# Patient Record
Sex: MALE | Race: WHITE | URBAN - METROPOLITAN AREA
[De-identification: names, ages, dates, MRNs, and addresses within clinical notes are randomized per-mention and may not be internally consistent; named-entity substitution may affect disease eponyms.]

---

## 2017-04-13 ENCOUNTER — OFFICE VISIT (OUTPATIENT)
Dept: FAMILY MEDICINE CLINIC | Age: 37
End: 2017-04-13

## 2017-04-13 VITALS
DIASTOLIC BLOOD PRESSURE: 79 MMHG | SYSTOLIC BLOOD PRESSURE: 121 MMHG | HEART RATE: 63 BPM | WEIGHT: 254 LBS | BODY MASS INDEX: 34.4 KG/M2 | HEIGHT: 72 IN

## 2017-04-13 DIAGNOSIS — I10 ESSENTIAL HYPERTENSION, BENIGN: ICD-10-CM

## 2017-04-13 DIAGNOSIS — K21.9 GASTROESOPHAGEAL REFLUX DISEASE WITHOUT ESOPHAGITIS: ICD-10-CM

## 2017-04-13 DIAGNOSIS — J30.1 SEASONAL ALLERGIC RHINITIS DUE TO POLLEN: Primary | ICD-10-CM

## 2017-04-13 LAB
A/G RATIO: 1.7 (ref 1.1–2.2)
ALBUMIN SERPL-MCNC: 4.6 G/DL (ref 3.4–5)
ALP BLD-CCNC: 103 U/L (ref 40–129)
ALT SERPL-CCNC: 36 U/L (ref 10–40)
ANION GAP SERPL CALCULATED.3IONS-SCNC: 18 MMOL/L (ref 3–16)
AST SERPL-CCNC: 26 U/L (ref 15–37)
BILIRUB SERPL-MCNC: 0.3 MG/DL (ref 0–1)
BUN BLDV-MCNC: 11 MG/DL (ref 7–20)
CALCIUM SERPL-MCNC: 9.8 MG/DL (ref 8.3–10.6)
CHLORIDE BLD-SCNC: 101 MMOL/L (ref 99–110)
CO2: 23 MMOL/L (ref 21–32)
CREAT SERPL-MCNC: 0.9 MG/DL (ref 0.9–1.3)
GFR AFRICAN AMERICAN: >60
GFR NON-AFRICAN AMERICAN: >60
GLOBULIN: 2.7 G/DL
GLUCOSE BLD-MCNC: 85 MG/DL (ref 70–99)
POTASSIUM SERPL-SCNC: 4.5 MMOL/L (ref 3.5–5.1)
SODIUM BLD-SCNC: 142 MMOL/L (ref 136–145)
TOTAL PROTEIN: 7.3 G/DL (ref 6.4–8.2)

## 2017-04-13 PROCEDURE — 99214 OFFICE O/P EST MOD 30 MIN: CPT | Performed by: FAMILY MEDICINE

## 2017-04-13 PROCEDURE — 36415 COLL VENOUS BLD VENIPUNCTURE: CPT | Performed by: FAMILY MEDICINE

## 2017-04-13 RX ORDER — AMLODIPINE BESYLATE 5 MG/1
TABLET ORAL
Qty: 30 TABLET | Refills: 5 | Status: SHIPPED | OUTPATIENT
Start: 2017-04-13 | End: 2017-10-29 | Stop reason: SDUPTHER

## 2017-04-13 RX ORDER — LISINOPRIL 10 MG/1
TABLET ORAL
Qty: 30 TABLET | Refills: 5 | Status: SHIPPED | OUTPATIENT
Start: 2017-04-13 | End: 2017-10-29 | Stop reason: SDUPTHER

## 2017-04-13 RX ORDER — OMEPRAZOLE 20 MG/1
CAPSULE, DELAYED RELEASE ORAL
Qty: 30 CAPSULE | Refills: 5 | Status: SHIPPED | OUTPATIENT
Start: 2017-04-13 | End: 2017-10-29 | Stop reason: SDUPTHER

## 2017-04-13 RX ORDER — AZELASTINE HYDROCHLORIDE, FLUTICASONE PROPIONATE 137; 50 UG/1; UG/1
1 SPRAY, METERED NASAL 2 TIMES DAILY PRN
Qty: 1 BOTTLE | Refills: 3 | Status: SHIPPED | OUTPATIENT
Start: 2017-04-13 | End: 2022-04-25

## 2017-10-29 DIAGNOSIS — K21.9 GASTROESOPHAGEAL REFLUX DISEASE WITHOUT ESOPHAGITIS: ICD-10-CM

## 2017-10-29 DIAGNOSIS — I10 ESSENTIAL HYPERTENSION, BENIGN: ICD-10-CM

## 2017-10-30 RX ORDER — OMEPRAZOLE 20 MG/1
CAPSULE, DELAYED RELEASE ORAL
Qty: 90 CAPSULE | Refills: 3 | Status: SHIPPED | OUTPATIENT
Start: 2017-10-30 | End: 2018-11-20 | Stop reason: SDUPTHER

## 2017-10-30 RX ORDER — AMLODIPINE BESYLATE 5 MG/1
TABLET ORAL
Qty: 90 TABLET | Refills: 3 | Status: SHIPPED | OUTPATIENT
Start: 2017-10-30 | End: 2018-09-19 | Stop reason: SDUPTHER

## 2017-10-30 RX ORDER — LISINOPRIL 10 MG/1
TABLET ORAL
Qty: 90 TABLET | Refills: 3 | Status: SHIPPED | OUTPATIENT
Start: 2017-10-30 | End: 2018-09-19 | Stop reason: SDUPTHER

## 2017-11-17 ENCOUNTER — OFFICE VISIT (OUTPATIENT)
Dept: FAMILY MEDICINE CLINIC | Age: 37
End: 2017-11-17

## 2017-11-17 VITALS
BODY MASS INDEX: 34.4 KG/M2 | DIASTOLIC BLOOD PRESSURE: 89 MMHG | SYSTOLIC BLOOD PRESSURE: 137 MMHG | HEART RATE: 96 BPM | HEIGHT: 72 IN | WEIGHT: 254 LBS

## 2017-11-17 DIAGNOSIS — E66.01 MORBID OBESITY DUE TO EXCESS CALORIES (HCC): ICD-10-CM

## 2017-11-17 DIAGNOSIS — K21.9 GASTROESOPHAGEAL REFLUX DISEASE WITHOUT ESOPHAGITIS: ICD-10-CM

## 2017-11-17 DIAGNOSIS — I10 ESSENTIAL HYPERTENSION, BENIGN: Primary | ICD-10-CM

## 2017-11-17 LAB
A/G RATIO: 1.7 (ref 1.1–2.2)
ALBUMIN SERPL-MCNC: 4.7 G/DL (ref 3.4–5)
ALP BLD-CCNC: 102 U/L (ref 40–129)
ALT SERPL-CCNC: 50 U/L (ref 10–40)
ANION GAP SERPL CALCULATED.3IONS-SCNC: 15 MMOL/L (ref 3–16)
AST SERPL-CCNC: 29 U/L (ref 15–37)
BILIRUB SERPL-MCNC: 0.6 MG/DL (ref 0–1)
BUN BLDV-MCNC: 11 MG/DL (ref 7–20)
CALCIUM SERPL-MCNC: 9.5 MG/DL (ref 8.3–10.6)
CHLORIDE BLD-SCNC: 98 MMOL/L (ref 99–110)
CHOLESTEROL, TOTAL: 211 MG/DL (ref 0–199)
CO2: 25 MMOL/L (ref 21–32)
CREAT SERPL-MCNC: 0.9 MG/DL (ref 0.9–1.3)
GFR AFRICAN AMERICAN: >60
GFR NON-AFRICAN AMERICAN: >60
GLOBULIN: 2.8 G/DL
GLUCOSE BLD-MCNC: 87 MG/DL (ref 70–99)
HDLC SERPL-MCNC: 56 MG/DL (ref 40–60)
LDL CHOLESTEROL CALCULATED: 123 MG/DL
POTASSIUM SERPL-SCNC: 4.6 MMOL/L (ref 3.5–5.1)
SODIUM BLD-SCNC: 138 MMOL/L (ref 136–145)
TOTAL PROTEIN: 7.5 G/DL (ref 6.4–8.2)
TRIGL SERPL-MCNC: 161 MG/DL (ref 0–150)
VLDLC SERPL CALC-MCNC: 32 MG/DL

## 2017-11-17 PROCEDURE — 99214 OFFICE O/P EST MOD 30 MIN: CPT | Performed by: FAMILY MEDICINE

## 2017-11-17 PROCEDURE — 36415 COLL VENOUS BLD VENIPUNCTURE: CPT | Performed by: FAMILY MEDICINE

## 2017-11-17 NOTE — PROGRESS NOTES
Chief Complaint   Patient presents with    Medication Check       HPI: [place] presents for evaluation and management of mutl issues. He notes he is taking and tolerating his BP meds. Notes he is having cough productive of sputum x 2 weeks. Tried steroids with only temporary benefit. Notes his heart burn is pretty well controlled notes no dysphagia. He has not made any progress with wt loss as has not been exercising much. ROS: no fever or chills,no sob, no chest pain, no palpitation, no abd pain, no n/v/d/c, no urinary frequency or dysuria,     Allergies   Allergen Reactions    Aleve [Naproxen Sodium]      Hives    Hctz      Hives      Codeine     Ibuprofen      angioedema    Peanuts [Peanut Oil]      Rash       New Prescriptions    No medications on file     Current Outpatient Prescriptions   Medication Sig Dispense Refill    omeprazole (PRILOSEC) 20 MG delayed release capsule TAKE ONE CAPSULE BY MOUTH DAILY 90 capsule 3    amLODIPine (NORVASC) 5 MG tablet TAKE ONE TABLET BY MOUTH DAILY 90 tablet 3    lisinopril (PRINIVIL;ZESTRIL) 10 MG tablet TAKE ONE TABLET BY MOUTH DAILY 90 tablet 3    Azelastine-Fluticasone (DYMISTA) 137-50 MCG/ACT SUSP 1 spray by Nasal route 2 times daily as needed (sneezing, runny nose) 1 Bottle 3    montelukast (SINGULAIR) 10 MG tablet Take 1 tablet by mouth nightly. 30 tablet 11    Cetirizine HCl (ZYRTEC ALLERGY) 10 MG CAPS Take 10 mg by mouth daily. 30 capsule 3     No current facility-administered medications for this visit.         Past Medical History:   Diagnosis Date    GERD (gastroesophageal reflux disease) 5/12/2014    Herniated lumbar disc without myelopathy     Dr. Sia Richardson HTN (hypertension) 3/20/2012    Irritable bowel syndrome     Morbid obesity due to excess calories ([address] 9/29/2016    Urticaria 2/14/2012    Eggs, milk, peanut, wheat Dr. Sherita Shrestha         Objective   /89   Pulse 96   Ht 6' (1.829 m)   Wt 254 lb (115.2

## 2018-05-09 ENCOUNTER — TELEPHONE (OUTPATIENT)
Dept: FAMILY MEDICINE CLINIC | Age: 38
End: 2018-05-09

## 2018-09-18 ENCOUNTER — TELEPHONE (OUTPATIENT)
Dept: FAMILY MEDICINE CLINIC | Age: 38
End: 2018-09-18

## 2018-09-18 DIAGNOSIS — I10 ESSENTIAL HYPERTENSION, BENIGN: ICD-10-CM

## 2018-09-19 RX ORDER — LISINOPRIL 10 MG/1
10 TABLET ORAL DAILY
Qty: 90 TABLET | Refills: 0 | Status: SHIPPED | OUTPATIENT
Start: 2018-09-19 | End: 2018-10-23 | Stop reason: SDUPTHER

## 2018-09-19 RX ORDER — AMLODIPINE BESYLATE 5 MG/1
5 TABLET ORAL DAILY
Qty: 90 TABLET | Refills: 0 | Status: SHIPPED | OUTPATIENT
Start: 2018-09-19 | End: 2018-10-23 | Stop reason: SDUPTHER

## 2018-09-25 ENCOUNTER — TELEPHONE (OUTPATIENT)
Dept: FAMILY MEDICINE CLINIC | Age: 38
End: 2018-09-25

## 2018-10-23 DIAGNOSIS — I10 ESSENTIAL HYPERTENSION, BENIGN: ICD-10-CM

## 2018-10-24 RX ORDER — AMLODIPINE BESYLATE 5 MG/1
5 TABLET ORAL DAILY
Qty: 90 TABLET | Refills: 0 | Status: SHIPPED | OUTPATIENT
Start: 2018-10-24 | End: 2019-02-22 | Stop reason: SDUPTHER

## 2018-10-24 RX ORDER — LISINOPRIL 10 MG/1
10 TABLET ORAL DAILY
Qty: 90 TABLET | Refills: 0 | Status: SHIPPED | OUTPATIENT
Start: 2018-10-24 | End: 2018-12-03 | Stop reason: SDUPTHER

## 2018-11-20 DIAGNOSIS — K21.9 GASTROESOPHAGEAL REFLUX DISEASE WITHOUT ESOPHAGITIS: ICD-10-CM

## 2018-11-21 RX ORDER — OMEPRAZOLE 20 MG/1
CAPSULE, DELAYED RELEASE ORAL
Qty: 30 CAPSULE | Refills: 0 | Status: SHIPPED | OUTPATIENT
Start: 2018-11-21 | End: 2018-12-29 | Stop reason: SDUPTHER

## 2018-12-03 ENCOUNTER — OFFICE VISIT (OUTPATIENT)
Dept: FAMILY MEDICINE CLINIC | Age: 38
End: 2018-12-03
Payer: COMMERCIAL

## 2018-12-03 VITALS
HEART RATE: 76 BPM | HEIGHT: 72 IN | WEIGHT: 258 LBS | SYSTOLIC BLOOD PRESSURE: 124 MMHG | BODY MASS INDEX: 34.95 KG/M2 | DIASTOLIC BLOOD PRESSURE: 92 MMHG

## 2018-12-03 DIAGNOSIS — Z23 NEED FOR VACCINATION: ICD-10-CM

## 2018-12-03 DIAGNOSIS — K21.9 GASTROESOPHAGEAL REFLUX DISEASE WITHOUT ESOPHAGITIS: ICD-10-CM

## 2018-12-03 DIAGNOSIS — I10 ESSENTIAL HYPERTENSION, BENIGN: Primary | ICD-10-CM

## 2018-12-03 DIAGNOSIS — E66.01 MORBID OBESITY DUE TO EXCESS CALORIES (HCC): ICD-10-CM

## 2018-12-03 LAB
A/G RATIO: 1.6 (ref 1.1–2.2)
ALBUMIN SERPL-MCNC: 4.7 G/DL (ref 3.4–5)
ALP BLD-CCNC: 91 U/L (ref 40–129)
ALT SERPL-CCNC: 39 U/L (ref 10–40)
ANION GAP SERPL CALCULATED.3IONS-SCNC: 15 MMOL/L (ref 3–16)
AST SERPL-CCNC: 30 U/L (ref 15–37)
BILIRUB SERPL-MCNC: 0.4 MG/DL (ref 0–1)
BUN BLDV-MCNC: 14 MG/DL (ref 7–20)
CALCIUM SERPL-MCNC: 9.8 MG/DL (ref 8.3–10.6)
CHLORIDE BLD-SCNC: 105 MMOL/L (ref 99–110)
CHOLESTEROL, TOTAL: 203 MG/DL (ref 0–199)
CO2: 21 MMOL/L (ref 21–32)
CREAT SERPL-MCNC: 0.9 MG/DL (ref 0.9–1.3)
GFR AFRICAN AMERICAN: >60
GFR NON-AFRICAN AMERICAN: >60
GLOBULIN: 2.9 G/DL
GLUCOSE BLD-MCNC: 109 MG/DL (ref 70–99)
HDLC SERPL-MCNC: 51 MG/DL (ref 40–60)
LDL CHOLESTEROL CALCULATED: 139 MG/DL
POTASSIUM SERPL-SCNC: 5.3 MMOL/L (ref 3.5–5.1)
SODIUM BLD-SCNC: 141 MMOL/L (ref 136–145)
TOTAL PROTEIN: 7.6 G/DL (ref 6.4–8.2)
TRIGL SERPL-MCNC: 67 MG/DL (ref 0–150)
VLDLC SERPL CALC-MCNC: 13 MG/DL

## 2018-12-03 PROCEDURE — 90686 IIV4 VACC NO PRSV 0.5 ML IM: CPT | Performed by: FAMILY MEDICINE

## 2018-12-03 PROCEDURE — 99214 OFFICE O/P EST MOD 30 MIN: CPT | Performed by: FAMILY MEDICINE

## 2018-12-03 PROCEDURE — 36415 COLL VENOUS BLD VENIPUNCTURE: CPT | Performed by: FAMILY MEDICINE

## 2018-12-03 PROCEDURE — 90471 IMMUNIZATION ADMIN: CPT | Performed by: FAMILY MEDICINE

## 2018-12-03 RX ORDER — LISINOPRIL 10 MG/1
10 TABLET ORAL DAILY
Qty: 90 TABLET | Refills: 1 | Status: SHIPPED | OUTPATIENT
Start: 2018-12-03 | End: 2019-10-01 | Stop reason: SDUPTHER

## 2018-12-03 ASSESSMENT — ENCOUNTER SYMPTOMS
NAUSEA: 0
ABDOMINAL PAIN: 0
VOMITING: 0
DIARRHEA: 0
CONSTIPATION: 0
SHORTNESS OF BREATH: 0
COUGH: 0

## 2018-12-03 ASSESSMENT — PATIENT HEALTH QUESTIONNAIRE - PHQ9
2. FEELING DOWN, DEPRESSED OR HOPELESS: 0
SUM OF ALL RESPONSES TO PHQ QUESTIONS 1-9: 0
1. LITTLE INTEREST OR PLEASURE IN DOING THINGS: 0
SUM OF ALL RESPONSES TO PHQ QUESTIONS 1-9: 0
SUM OF ALL RESPONSES TO PHQ9 QUESTIONS 1 & 2: 0

## 2018-12-03 NOTE — PROGRESS NOTES
Chief Complaint   Patient presents with    Hypertension    Gastroesophageal Reflux    Obesity         HPI:  Kiara is a 45 y.o. (:1980) here today for multiple medical problem. He is compliant with his blood pressure medications he is out of it now though and that's what brings him today without Lightheadedness, Urinary Frequency, Edema and Sedation  He is not checking Home Blood Pressures     He is out of his medications so he thinks his blood pressure will be high today. He has a lot of stress at work. He is compliant with his reflux medications and notes no heartburn or food sticking in their throat, regurgitation,  and bitter taste, unless he doesn't take his medication and eats late or eats the foods that trigger his symptoms       Patient cites health, increased physical ability as reasons for wanting to lose weight. Thinks they have trouble with weight due to: food choices, calorie intake, eating after 6pm, and lack of physical activity  Weight loss techniques attempted: self-directed dieting and very low calorie diet  Comorbidities: GERD and hypertension          Review of Systems   Constitutional: Negative for chills and fever. Respiratory: Negative for cough and shortness of breath. Cardiovascular: Negative for chest pain and palpitations. Gastrointestinal: Negative for abdominal pain, constipation, diarrhea, nausea and vomiting. Endocrine: Negative for polyuria. Genitourinary: Negative for dysuria. Past Medical History:   Diagnosis Date    GERD (gastroesophageal reflux disease) 2014    Herniated lumbar disc without myelopathy     Dr. Maris Edwards HTN (hypertension) 3/20/2012    Irritable bowel syndrome     Morbid obesity due to excess calories [address] [address] 2016    Urticaria 2012    Eggs, milk, peanut, wheat Dr. Aurelio Echeverria     No family history on file.   Social History     Social History    Marital status: Single     Spouse name: N/A    Number of

## 2018-12-04 NOTE — PROGRESS NOTES
Call Results (labs or images) were ok, cholesterol is about the same and potassium is slightly elevated. Needs to keep follow up as suggested. Ask patient to sign up for MyChart.

## 2018-12-29 DIAGNOSIS — K21.9 GASTROESOPHAGEAL REFLUX DISEASE WITHOUT ESOPHAGITIS: ICD-10-CM

## 2019-01-03 RX ORDER — OMEPRAZOLE 20 MG/1
CAPSULE, DELAYED RELEASE ORAL
Qty: 30 CAPSULE | Refills: 0 | Status: SHIPPED | OUTPATIENT
Start: 2019-01-03 | End: 2019-02-22 | Stop reason: SDUPTHER

## 2019-01-08 ENCOUNTER — TELEPHONE (OUTPATIENT)
Dept: PRIMARY CARE CLINIC | Age: 39
End: 2019-01-08

## 2019-02-20 ENCOUNTER — TELEPHONE (OUTPATIENT)
Dept: PRIMARY CARE CLINIC | Age: 39
End: 2019-02-20

## 2019-02-21 PROBLEM — E78.00 HYPERCHOLESTEROLEMIA: Status: ACTIVE | Noted: 2019-02-21

## 2019-02-22 ENCOUNTER — OFFICE VISIT (OUTPATIENT)
Dept: PRIMARY CARE CLINIC | Age: 39
End: 2019-02-22
Payer: COMMERCIAL

## 2019-02-22 VITALS
DIASTOLIC BLOOD PRESSURE: 80 MMHG | WEIGHT: 255 LBS | HEIGHT: 72 IN | BODY MASS INDEX: 34.54 KG/M2 | SYSTOLIC BLOOD PRESSURE: 132 MMHG | HEART RATE: 77 BPM

## 2019-02-22 DIAGNOSIS — L50.9 URTICARIA: ICD-10-CM

## 2019-02-22 DIAGNOSIS — E66.01 MORBID OBESITY DUE TO EXCESS CALORIES (HCC): ICD-10-CM

## 2019-02-22 DIAGNOSIS — E78.00 HYPERCHOLESTEROLEMIA: ICD-10-CM

## 2019-02-22 DIAGNOSIS — I10 ESSENTIAL HYPERTENSION, BENIGN: Primary | ICD-10-CM

## 2019-02-22 DIAGNOSIS — K21.9 GASTROESOPHAGEAL REFLUX DISEASE WITHOUT ESOPHAGITIS: ICD-10-CM

## 2019-02-22 PROCEDURE — 99214 OFFICE O/P EST MOD 30 MIN: CPT | Performed by: FAMILY MEDICINE

## 2019-02-22 RX ORDER — OMEPRAZOLE 20 MG/1
CAPSULE, DELAYED RELEASE ORAL
Qty: 90 CAPSULE | Refills: 3 | Status: SHIPPED | OUTPATIENT
Start: 2019-02-22 | End: 2019-08-23 | Stop reason: ALTCHOICE

## 2019-02-22 RX ORDER — MONTELUKAST SODIUM 10 MG/1
10 TABLET ORAL NIGHTLY
Qty: 30 TABLET | Refills: 11 | Status: SHIPPED | OUTPATIENT
Start: 2019-02-22 | End: 2022-04-25

## 2019-02-22 RX ORDER — AMLODIPINE BESYLATE 5 MG/1
5 TABLET ORAL DAILY
Qty: 90 TABLET | Refills: 1 | Status: SHIPPED | OUTPATIENT
Start: 2019-02-22 | End: 2019-08-22 | Stop reason: SDUPTHER

## 2019-02-22 ASSESSMENT — PATIENT HEALTH QUESTIONNAIRE - PHQ9
SUM OF ALL RESPONSES TO PHQ QUESTIONS 1-9: 0
1. LITTLE INTEREST OR PLEASURE IN DOING THINGS: 0
2. FEELING DOWN, DEPRESSED OR HOPELESS: 0
SUM OF ALL RESPONSES TO PHQ9 QUESTIONS 1 & 2: 0
SUM OF ALL RESPONSES TO PHQ QUESTIONS 1-9: 0

## 2019-02-22 ASSESSMENT — ENCOUNTER SYMPTOMS
COUGH: 0
ABDOMINAL PAIN: 0
SHORTNESS OF BREATH: 0
CONSTIPATION: 0
NAUSEA: 0
DIARRHEA: 0
VOMITING: 0

## 2019-08-09 ENCOUNTER — TELEPHONE (OUTPATIENT)
Dept: PRIMARY CARE CLINIC | Age: 39
End: 2019-08-09

## 2019-08-09 NOTE — TELEPHONE ENCOUNTER
Dr. Kel Merlos 08/23:    Notes: pt not due for any PVP labs/HM. I did tell him he could fast if he wanted to since he has a 7 am appt. Has dx of HLD, but no cholesterol medications. This patient has an upcoming appointment with you for Hyperlipidemia and Hypertension. In planning for that visit I have completed the following pre-visit planning:     Pre-Visit Planning Checklist:  Patient contacted: yes  Verified patient by name and date of birth: yes    Health Maintenance items reviewed:    No pre-visit planning health maintenance topics to review at this time    Labs and procedures pended: Non-Fasting none pending/due   Labs and procedures discussed with patient: no  Reminded patient to check with their insurance company about coverage for lab tests and lab location: no    Preliminary Medication Reconciliation: was performed. Reminded patient to arrive early: yes    Please complete the med-reconciliation and sign the appropriate labs as soon as possible.       Gabby Patrick  Patient Services Specialist  [phone]

## 2019-08-23 ENCOUNTER — OFFICE VISIT (OUTPATIENT)
Dept: PRIMARY CARE CLINIC | Age: 39
End: 2019-08-23
Payer: COMMERCIAL

## 2019-08-23 VITALS
DIASTOLIC BLOOD PRESSURE: 89 MMHG | HEART RATE: 71 BPM | BODY MASS INDEX: 34.92 KG/M2 | SYSTOLIC BLOOD PRESSURE: 131 MMHG | WEIGHT: 257.8 LBS | HEIGHT: 72 IN

## 2019-08-23 DIAGNOSIS — I10 ESSENTIAL HYPERTENSION, BENIGN: Primary | ICD-10-CM

## 2019-08-23 DIAGNOSIS — E66.01 MORBID OBESITY DUE TO EXCESS CALORIES (HCC): ICD-10-CM

## 2019-08-23 DIAGNOSIS — E87.5 HYPERKALEMIA: ICD-10-CM

## 2019-08-23 DIAGNOSIS — R73.09 ELEVATED GLUCOSE: ICD-10-CM

## 2019-08-23 DIAGNOSIS — K21.9 GASTROESOPHAGEAL REFLUX DISEASE WITHOUT ESOPHAGITIS: ICD-10-CM

## 2019-08-23 LAB
A/G RATIO: 2.1 (ref 1.1–2.2)
ALBUMIN SERPL-MCNC: 4.7 G/DL (ref 3.4–5)
ALP BLD-CCNC: 81 U/L (ref 40–129)
ALT SERPL-CCNC: 36 U/L (ref 10–40)
ANION GAP SERPL CALCULATED.3IONS-SCNC: 15 MMOL/L (ref 3–16)
AST SERPL-CCNC: 23 U/L (ref 15–37)
BILIRUB SERPL-MCNC: 0.4 MG/DL (ref 0–1)
BUN BLDV-MCNC: 16 MG/DL (ref 7–20)
CALCIUM SERPL-MCNC: 9.5 MG/DL (ref 8.3–10.6)
CHLORIDE BLD-SCNC: 100 MMOL/L (ref 99–110)
CO2: 23 MMOL/L (ref 21–32)
CREAT SERPL-MCNC: 0.9 MG/DL (ref 0.9–1.3)
GFR AFRICAN AMERICAN: >60
GFR NON-AFRICAN AMERICAN: >60
GLOBULIN: 2.2 G/DL
GLUCOSE BLD-MCNC: 108 MG/DL (ref 70–99)
HBA1C MFR BLD: 5.6 %
POTASSIUM SERPL-SCNC: 4.3 MMOL/L (ref 3.5–5.1)
SODIUM BLD-SCNC: 138 MMOL/L (ref 136–145)
TOTAL PROTEIN: 6.9 G/DL (ref 6.4–8.2)

## 2019-08-23 PROCEDURE — 83036 HEMOGLOBIN GLYCOSYLATED A1C: CPT | Performed by: FAMILY MEDICINE

## 2019-08-23 PROCEDURE — 99214 OFFICE O/P EST MOD 30 MIN: CPT | Performed by: FAMILY MEDICINE

## 2019-08-23 RX ORDER — PANTOPRAZOLE SODIUM 40 MG/1
40 TABLET, DELAYED RELEASE ORAL DAILY
Qty: 30 TABLET | Refills: 11 | Status: SHIPPED | OUTPATIENT
Start: 2019-08-23 | End: 2020-03-12 | Stop reason: SDUPTHER

## 2019-08-23 ASSESSMENT — ENCOUNTER SYMPTOMS
EYE PAIN: 0
VOMITING: 0
ABDOMINAL PAIN: 0
NAUSEA: 0
COUGH: 0
CONSTIPATION: 0
RHINORRHEA: 0
COLOR CHANGE: 0
SORE THROAT: 0
DIARRHEA: 0
SHORTNESS OF BREATH: 0

## 2019-08-23 NOTE — PATIENT INSTRUCTIONS
Patient Education        DASH Diet: Care Instructions  Your Care Instructions    The DASH diet is an eating plan that can help lower your blood pressure. DASH stands for Dietary Approaches to Stop Hypertension. Hypertension is high blood pressure. The DASH diet focuses on eating foods that are high in calcium, potassium, and magnesium. These nutrients can lower blood pressure. The foods that are highest in these nutrients are fruits, vegetables, low-fat dairy products, nuts, seeds, and legumes. But taking calcium, potassium, and magnesium supplements instead of eating foods that are high in those nutrients does not have the same effect. The DASH diet also includes whole grains, fish, and poultry. The DASH diet is one of several lifestyle changes your doctor may recommend to lower your high blood pressure. Your doctor may also want you to decrease the amount of sodium in your diet. Lowering sodium while following the DASH diet can lower blood pressure even further than just the DASH diet alone. Follow-up care is a key part of your treatment and safety. Be sure to make and go to all appointments, and call your doctor if you are having problems. It's also a good idea to know your test results and keep a list of the medicines you take. How can you care for yourself at home? Following the DASH diet  · Eat 4 to 5 servings of fruit each day. A serving is 1 medium-sized piece of fruit, ½ cup chopped or canned fruit, 1/4 cup dried fruit, or 4 ounces (½ cup) of fruit juice. Choose fruit more often than fruit juice. · Eat 4 to 5 servings of vegetables each day. A serving is 1 cup of lettuce or raw leafy vegetables, ½ cup of chopped or cooked vegetables, or 4 ounces (½ cup) of vegetable juice. Choose vegetables more often than vegetable juice. · Get 2 to 3 servings of low-fat and fat-free dairy each day. A serving is 8 ounces of milk, 1 cup of yogurt, or 1 ½ ounces of cheese. · Eat 6 to 8 servings of grains each day. meals using beans and peas. Add garbanzo or kidney beans to salads. Make burritos and tacos with mashed almodovar beans or black beans. Where can you learn more? Go to https://samuel.[employer]. org and sign in to your [employer] account. Enter F399 in the regrob.com box to learn more about \"DASH Diet: Care Instructions. \"     If you do not have an account, please click on the \"Sign Up Now\" link. Current as of: July 22, 2018  Content Version: 12.1  © 3382-1153 [employer]wise, [employer]. Care instructions adapted under license by [facility] ([facility]). If you have questions about a medical condition or this instruction, always ask your healthcare professional. [address] any warranty or liability for your use of this information. You may receive a survey regarding the care you received during your visit. Your input is valuable to us. We encourage you to complete and return your survey. We hope you will choose us in the future for your healthcare needs.

## 2019-08-23 NOTE — PROGRESS NOTES
atraumatic. Right Ear: External ear normal.   Left Ear: External ear normal.   Nose: Nose normal.   Mouth/Throat: Oropharynx is clear and moist.   Eyes: Pupils are equal, round, and reactive to light. Conjunctivae and EOM are normal.   Neck: Normal range of motion. Neck supple. Cardiovascular: Normal rate, regular rhythm, normal heart sounds and intact distal pulses. Pulmonary/Chest: Effort normal and breath sounds normal.   Abdominal: Soft. Bowel sounds are normal.   Musculoskeletal: Normal range of motion. Neurological: He is oriented to person, place, and time. Skin: Skin is warm and dry. Psychiatric: He has a normal mood and affect. His behavior is normal. Judgment and thought content normal.         Chemistry        Component Value Date/Time     12/03/2018 0950    K 5.3 (H) 12/03/2018 0950     12/03/2018 0950    CO2 21 12/03/2018 0950    BUN 14 12/03/2018 0950    CREATININE 0.9 12/03/2018 0950        Component Value Date/Time    CALCIUM 9.8 12/03/2018 0950    ALKPHOS 91 12/03/2018 0950    AST 30 12/03/2018 0950    ALT 39 12/03/2018 0950    BILITOT 0.4 12/03/2018 0950          Lab Results   Component Value Date    WBC 6.5 03/28/2012    HGB 15.4 03/28/2012    HCT 44.8 03/28/2012    MCV 88.1 03/28/2012     03/28/2012     No results found for: LABA1C  No results found for: EAG  No results found for: LABA1C  No components found for: CHLPL  Lab Results   Component Value Date    TRIG 67 12/03/2018    TRIG 161 (H) 11/17/2017    TRIG 85 02/27/2015     Lab Results   Component Value Date    HDL 51 12/03/2018    HDL 56 11/17/2017    HDL 60 02/27/2015     Lab Results   Component Value Date    LDLCALC 139 (H) 12/03/2018    LDLCALC 123 (H) 11/17/2017    LDLCALC 107 (H) 02/27/2015     Lab Results   Component Value Date    LABVLDL 13 12/03/2018    LABVLDL 32 11/17/2017    LABVLDL 17 02/27/2015         Assessment   Plan     1. Essential hypertension, benign  Controlled: Appears stable.   We will

## 2019-09-30 DIAGNOSIS — I10 ESSENTIAL HYPERTENSION, BENIGN: ICD-10-CM

## 2019-10-01 RX ORDER — LISINOPRIL 10 MG/1
10 TABLET ORAL DAILY
Qty: 90 TABLET | Refills: 1 | Status: SHIPPED | OUTPATIENT
Start: 2019-10-01 | End: 2020-03-20 | Stop reason: SDUPTHER

## 2019-11-19 DIAGNOSIS — I10 ESSENTIAL HYPERTENSION, BENIGN: ICD-10-CM

## 2019-11-19 RX ORDER — AMLODIPINE BESYLATE 5 MG/1
TABLET ORAL
Qty: 90 TABLET | Refills: 0 | Status: SHIPPED | OUTPATIENT
Start: 2019-11-19 | End: 2020-02-19

## 2020-02-19 RX ORDER — OMEPRAZOLE 20 MG/1
CAPSULE, DELAYED RELEASE ORAL
Qty: 90 CAPSULE | Refills: 2 | OUTPATIENT
Start: 2020-02-19

## 2020-02-19 RX ORDER — AMLODIPINE BESYLATE 5 MG/1
TABLET ORAL
Qty: 90 TABLET | Refills: 0 | Status: SHIPPED | OUTPATIENT
Start: 2020-02-19 | End: 2020-03-20 | Stop reason: SDUPTHER

## 2020-03-12 RX ORDER — PANTOPRAZOLE SODIUM 40 MG/1
40 TABLET, DELAYED RELEASE ORAL DAILY
Qty: 30 TABLET | Refills: 11 | Status: SHIPPED | OUTPATIENT
Start: 2020-03-12 | End: 2020-03-20 | Stop reason: SDUPTHER

## 2020-03-20 ENCOUNTER — OFFICE VISIT (OUTPATIENT)
Dept: PRIMARY CARE CLINIC | Age: 40
End: 2020-03-20
Payer: COMMERCIAL

## 2020-03-20 VITALS
SYSTOLIC BLOOD PRESSURE: 138 MMHG | BODY MASS INDEX: 33.86 KG/M2 | HEART RATE: 72 BPM | TEMPERATURE: 98.9 F | WEIGHT: 250 LBS | DIASTOLIC BLOOD PRESSURE: 98 MMHG | HEIGHT: 72 IN

## 2020-03-20 PROCEDURE — 99214 OFFICE O/P EST MOD 30 MIN: CPT | Performed by: FAMILY MEDICINE

## 2020-03-20 RX ORDER — AMLODIPINE BESYLATE 5 MG/1
TABLET ORAL
Qty: 90 TABLET | Refills: 1 | Status: SHIPPED | OUTPATIENT
Start: 2020-03-20 | End: 2020-09-04 | Stop reason: SDUPTHER

## 2020-03-20 RX ORDER — LISINOPRIL 10 MG/1
10 TABLET ORAL DAILY
Qty: 90 TABLET | Refills: 1 | Status: SHIPPED | OUTPATIENT
Start: 2020-03-20 | End: 2020-09-04 | Stop reason: SDUPTHER

## 2020-03-20 RX ORDER — PANTOPRAZOLE SODIUM 40 MG/1
40 TABLET, DELAYED RELEASE ORAL DAILY
Qty: 90 TABLET | Refills: 1 | Status: SHIPPED | OUTPATIENT
Start: 2020-03-20 | End: 2020-09-04 | Stop reason: SDUPTHER

## 2020-03-20 ASSESSMENT — ENCOUNTER SYMPTOMS
DIARRHEA: 0
SHORTNESS OF BREATH: 0
SINUS PRESSURE: 0
NAUSEA: 0
SORE THROAT: 0
COUGH: 0
ABDOMINAL PAIN: 0
VOMITING: 0
CONSTIPATION: 0

## 2020-03-20 ASSESSMENT — PATIENT HEALTH QUESTIONNAIRE - PHQ9
SUM OF ALL RESPONSES TO PHQ QUESTIONS 1-9: 0
1. LITTLE INTEREST OR PLEASURE IN DOING THINGS: 0
SUM OF ALL RESPONSES TO PHQ9 QUESTIONS 1 & 2: 0
SUM OF ALL RESPONSES TO PHQ QUESTIONS 1-9: 0
2. FEELING DOWN, DEPRESSED OR HOPELESS: 0

## 2020-03-20 NOTE — PATIENT INSTRUCTIONS
If you want to feel better these are the FUNDAMENTAL PILLARS of Wellness:    Make it EASY to do the RIGHT THINGS. 1)  You can choose to Get 150 min/week of moderate exercise (can talk but can't sing) or 75 min/week of vigorous exercise (can't talk)   This will enhance your sense of well being (Exercise is as good as medicine for depression.)    2)  You can choose to Get 7-9 hours of sleep per night    Detoxifies your brain, reduces risk of dementia    3)  You can choose to Strength Train 2 x a week on non-consecutive days   This will improve function and reduce risk of injury. Body weight type exercises such as Yoga and Pilates are good    4)  You can choose good nutrition. Only eat your goal weight (in lbs) x 10 calories/day and get 5 servings of Vegetables/day   Plant based diets reduce risk of heart attack/stroke and will help you feel full on less food. Avoid highly processed foods and processed carbohydrates. 5)  You can choose moderate alcohol intake < 1-2 drinks/day   Alcohol will disrupt your sleep and add calories to your day    6)  You can choose to develop a Charismatic/Supportive relationship. This will strengthen your resilience for the ups and downs. 7)  You can choose to Practice Mindfulness. An hour a day of prayer/meditation/gratitude will change your life! Here are some recommendations for weight loss:  Check into The GI Diet or \"Primal diet\", Intermittent fasting. Take your desired weight in pounds and multiply by 10 and that is your average daily calorie allowance. For example if you wish to weigh 170 lb x 10 = 1700 josefina/day (this is how to gradually lose the weight and maintain your desired weight). Avoid soda/coke and all \"wet carbs\" => Drink ice water instead    Drink a large glass of ice water before meals and EAT SLOWLY (talk while you eat)! Rethink your hunger => it means your losing weight.     Minimize carbohydrates as they stimulate your appetite.   Avoid Bread, Rice, Pasta and Potatoes      Avoid eating calories after 6 pm  Try taking your calories only in 6 hours of the day - fast the rest of the day

## 2020-03-20 NOTE — PROGRESS NOTES
Chief Complaint   Patient presents with    Hypertension    Gastroesophageal Reflux    Obesity       HPI: Kiara presents for evaluation and management of multiple medical issues. He is here today because he was traveling out of state and left his medications in a hotel room. He has been without them for two days. He does check his home blood pressures occasionally with a wrist cuff. He is normally compliant with his medications for hypertension without dizziness, weakness or sedation. He is taking omeprazole daily and denies any changes in his GERD symptoms. He is not exercising outside of work, but states he takes nearly 12,000 steps daily. He travels for work frequently and admits that he makes poor dietary choices but has been working on it lately. He says he would like to continue to lose weight. Review of Systems   Constitutional: Negative for chills and fever. HENT: Negative for sinus pressure and sore throat. Respiratory: Negative for cough and shortness of breath. Cardiovascular: Negative for chest pain and palpitations. Gastrointestinal: Negative for abdominal pain, constipation, diarrhea, nausea and vomiting. Endocrine: Negative for polyuria. Genitourinary: Negative for dysuria.        Allergies   Allergen Reactions    Aleve [Naproxen Sodium]      Hives    Hctz      Hives      Codeine     Ibuprofen      angioedema    Peanuts [Peanut Oil]      Rash       New Prescriptions    No medications on file     Current Outpatient Medications   Medication Sig Dispense Refill    pantoprazole (PROTONIX) 40 MG tablet Take 1 tablet by mouth daily 90 tablet 1    amLODIPine (NORVASC) 5 MG tablet TAKE ONE TABLET BY MOUTH DAILY 90 tablet 1    lisinopril (PRINIVIL;ZESTRIL) 10 MG tablet Take 1 tablet by mouth daily 90 tablet 1    Azelastine-Fluticasone (DYMISTA) 137-50 MCG/ACT SUSP 1 spray by Nasal route 2 times daily as needed (sneezing, runny nose) 1 Bottle 3    Cetirizine HCl (ZYRTEC ALLERGY) 10 MG CAPS Take 10 mg by mouth daily. 30 capsule 3    montelukast (SINGULAIR) 10 MG tablet Take 1 tablet by mouth nightly (Patient not taking: Reported on 8/23/2019) 30 tablet 11     No current facility-administered medications for this visit. Past Medical History:   Diagnosis Date    GERD (gastroesophageal reflux disease) 5/12/2014    Herniated lumbar disc without myelopathy     Dr. Annabelle Bamberger HTN (hypertension) 3/20/2012    Irritable bowel syndrome     Morbid obesity due to excess calories ([address] 9/29/2016    Urticaria 2/14/2012    Eggs, milk, peanut, wheat Dr. Claudean Frock         Objective   BP (!) 138/98   Pulse 72   Temp 98.9 °F (37.2 °C)   Ht 6' (1.829 m)   Wt 250 lb (113.4 kg)   BMI 33.91 kg/m²   Wt Readings from Last 3 Encounters:   03/20/20 250 lb (113.4 kg)   08/23/19 257 lb 12.8 oz (116.9 kg)   02/22/19 255 lb (115.7 kg)       Physical Exam  Constitutional:       Appearance: Normal appearance. He is well-developed. HENT:      Head: Normocephalic. Right Ear: Tympanic membrane and ear canal normal.      Left Ear: Tympanic membrane and ear canal normal.      Nose: Nose normal.      Mouth/Throat:      Mouth: Mucous membranes are moist.      Pharynx: Uvula midline. Eyes:      General: No scleral icterus. Conjunctiva/sclera: Conjunctivae normal.      Pupils: Pupils are equal, round, and reactive to light. Neck:      Musculoskeletal: Neck supple. Thyroid: No thyromegaly. Cardiovascular:      Rate and Rhythm: Normal rate and regular rhythm. Pulses:           Dorsalis pedis pulses are 2+ on the right side and 2+ on the left side. Posterior tibial pulses are 2+ on the right side and 2+ on the left side. Heart sounds: Normal heart sounds. No murmur. No gallop. Comments: no edema lower extremities  Pulmonary:      Effort: Pulmonary effort is normal.      Breath sounds: Normal breath sounds. No wheezing or rales.    Abdominal:      General: of dietary sodium and alcohol.  - amLODIPine (NORVASC) 5 MG tablet; TAKE ONE TABLET BY MOUTH DAILY  Dispense: 90 tablet; Refill: 1  - lisinopril (PRINIVIL;ZESTRIL) 10 MG tablet; Take 1 tablet by mouth daily  Dispense: 90 tablet; Refill: 1    2. Gastroesophageal reflux disease without esophagitis  Controlled: Appears stable. We will continue current management and monitor for adverse reaction and disease progression. Follow-up as noted below    - pantoprazole (PROTONIX) 40 MG tablet; Take 1 tablet by mouth daily  Dispense: 90 tablet; Refill: 1    3. Morbid obesity due to excess calories [facility])  Improving: continue current plan of making healthier food choices  and cutting back on high calorie foods and beverages. Patient given educational materials on nutrition, exercise and fundamental pillars of wellness. Discussed use, benefit, and side effects of prescribed medications. Barriers to medication compliance addressed. All patient questions answered. Pt voiced understanding.        Health Maintenance   Topic Date Due    Varicella vaccine (1 of 2 - 2-dose childhood series) 10/03/1981    Flu vaccine (1) 09/01/2019    Potassium monitoring  08/23/2020    Creatinine monitoring  08/23/2020    DTaP/Tdap/Td vaccine (2 - Td) 04/05/2026    Shingles Vaccine (1 of 2) 10/03/2030    HIV screen  Completed    Hepatitis A vaccine  Aged Out    Hepatitis B vaccine  Aged Out    Hib vaccine  Aged Out    Meningococcal (ACWY) vaccine  Aged Out    Pneumococcal 0-64 years Vaccine  Aged Out       RTC 6 weeks

## 2020-09-04 ENCOUNTER — OFFICE VISIT (OUTPATIENT)
Dept: PRIMARY CARE CLINIC | Age: 40
End: 2020-09-04
Payer: COMMERCIAL

## 2020-09-04 VITALS
TEMPERATURE: 97.9 F | BODY MASS INDEX: 35.24 KG/M2 | SYSTOLIC BLOOD PRESSURE: 134 MMHG | DIASTOLIC BLOOD PRESSURE: 89 MMHG | WEIGHT: 259.8 LBS | HEART RATE: 65 BPM

## 2020-09-04 DIAGNOSIS — I10 ESSENTIAL HYPERTENSION, BENIGN: ICD-10-CM

## 2020-09-04 LAB
A/G RATIO: 1.8 (ref 1.1–2.2)
ALBUMIN SERPL-MCNC: 4.8 G/DL (ref 3.4–5)
ALP BLD-CCNC: 94 U/L (ref 40–129)
ALT SERPL-CCNC: 35 U/L (ref 10–40)
ANION GAP SERPL CALCULATED.3IONS-SCNC: 12 MMOL/L (ref 3–16)
AST SERPL-CCNC: 26 U/L (ref 15–37)
BILIRUB SERPL-MCNC: 0.5 MG/DL (ref 0–1)
BUN BLDV-MCNC: 12 MG/DL (ref 7–20)
CALCIUM SERPL-MCNC: 9.7 MG/DL (ref 8.3–10.6)
CHLORIDE BLD-SCNC: 101 MMOL/L (ref 99–110)
CHOLESTEROL, TOTAL: 241 MG/DL (ref 0–199)
CO2: 23 MMOL/L (ref 21–32)
CREAT SERPL-MCNC: 0.9 MG/DL (ref 0.9–1.3)
GFR AFRICAN AMERICAN: >60
GFR NON-AFRICAN AMERICAN: >60
GLOBULIN: 2.7 G/DL
GLUCOSE BLD-MCNC: 87 MG/DL (ref 70–99)
HDLC SERPL-MCNC: 52 MG/DL (ref 40–60)
LDL CHOLESTEROL CALCULATED: 168 MG/DL
POTASSIUM SERPL-SCNC: 4.4 MMOL/L (ref 3.5–5.1)
SODIUM BLD-SCNC: 136 MMOL/L (ref 136–145)
TOTAL PROTEIN: 7.5 G/DL (ref 6.4–8.2)
TRIGL SERPL-MCNC: 103 MG/DL (ref 0–150)
VLDLC SERPL CALC-MCNC: 21 MG/DL

## 2020-09-04 PROCEDURE — 99214 OFFICE O/P EST MOD 30 MIN: CPT | Performed by: FAMILY MEDICINE

## 2020-09-04 RX ORDER — AMLODIPINE BESYLATE 5 MG/1
TABLET ORAL
Qty: 90 TABLET | Refills: 1 | Status: SHIPPED | OUTPATIENT
Start: 2020-09-04 | End: 2021-07-08

## 2020-09-04 RX ORDER — LISINOPRIL 10 MG/1
10 TABLET ORAL DAILY
Qty: 90 TABLET | Refills: 1 | Status: SHIPPED | OUTPATIENT
Start: 2020-09-04 | End: 2020-09-14

## 2020-09-04 RX ORDER — PANTOPRAZOLE SODIUM 40 MG/1
40 TABLET, DELAYED RELEASE ORAL DAILY
Qty: 90 TABLET | Refills: 1 | Status: SHIPPED | OUTPATIENT
Start: 2020-09-04 | End: 2021-10-25 | Stop reason: SDUPTHER

## 2020-09-04 ASSESSMENT — ENCOUNTER SYMPTOMS
VOMITING: 0
ABDOMINAL PAIN: 0
SHORTNESS OF BREATH: 0
NAUSEA: 0
CONSTIPATION: 0
DIARRHEA: 0
COUGH: 0

## 2020-09-04 NOTE — PATIENT INSTRUCTIONS
how to gradually lose the weight and maintain your desired weight). Avoid soda/coke and all \"wet carbs\" => Drink ice water instead    Drink a large glass of ice water before meals and EAT SLOWLY (talk while you eat)! Rethink your hunger => it means your losing weight. Minimize highly processed carbohydrates as they stimulate your appetite:  Specifically cut back on Bread, Rice, Pasta and Potatoes    Avoid eating calories after 6 pm      Just How Much of a Benefit Do We Get from a Healthful Lifestyle? Xiomara Jacobo MD reviewing Pavan Ni et al. BMJ 2020 Jan 8  A long-term analysis suggests that adopting such a lifestyle at midlife might add as long as 10 years of disease-free life. Virtually everyone knows that a healthful lifestyle -- never smoking, normal body-mass index (BMI), moderate-to-vigorous physical activity, moderate alcohol intake, and a higher-quality diet -- is good for their health. What very few people know is just how much benefit they get from achieving all these lifestyle goals. A [place] team examined data from about 111,000 people at age 48 and followed them prospectively for as long as 34 years. Healthful lifestyle factors were measured repeatedly and systematically, and development of various diseases and death were recorded. The primary endpoint was life expectancy free from diabetes, cardiovascular diseases, and cancer. Women who met all the healthful lifestyle measures had an additional 10.7 years of disease-free life compared with women who met no healthful lifestyle measures. For men, the number was 7.6 additional disease-free years.

## 2020-09-04 NOTE — PROGRESS NOTES
Chief Complaint   Patient presents with    Hypertension    Gastroesophageal Reflux    Obesity         HPI:  Kiara is a 44 y.o. (:1980) here today   for multiple medical issues    Kiara feels pretty good. He is taking and tolerating his blood pressure medicine without lightheadedness or dizziness. He is also taking his PPI therapy for his reflux. Notes that is well controlled without food sticking in his throat or heartburn. He continues to struggle with diet. He has an active job but has not been managing his food intake very well lately. Review of Systems   Constitutional: Negative for chills and fever. Respiratory: Negative for cough and shortness of breath. Cardiovascular: Negative for chest pain and palpitations. Gastrointestinal: Negative for abdominal pain, constipation, diarrhea, nausea and vomiting. Endocrine: Negative for polyuria. Genitourinary: Negative for dysuria. Past Medical History:   Diagnosis Date    GERD (gastroesophageal reflux disease) 2014    Herniated lumbar disc without myelopathy     Dr. Alexandro Sandoval HTN (hypertension) 3/20/2012    Irritable bowel syndrome     Morbid obesity due to excess calories ([facility] [address] 2016    Urticaria 2012    Eggs, milk, peanut, wheat Dr. Juan Carlos Portillo     History reviewed. No pertinent family history.   Social History     Socioeconomic History    Marital status: Single     Spouse name: Cass Gutiérrez Number of children: Not on file    Years of education: Not on file    Highest education level: Not on file   Occupational History    Occupation: [employer] Financial resource strain: Not on file    Food insecurity     Worry: Not on file     Inability: Not on file   [employer] needs     Medical: Not on file     Non-medical: Not on file   Tobacco Use    Smoking status: Never Smoker    Smokeless tobacco: Never Used   Substance and Sexual Activity    Alcohol use: Yes     Comment: weekends    Drug use: Not on file    Sexual activity: Not on file   Lifestyle    Physical activity     Days per week: Not on file     Minutes per session: Not on file    Stress: Not on file   Relationships    Social connections     Talks on phone: Not on file     Gets together: Not on file     Attends Ten[employer] service: Not on file     Active member of club or organization: Not on file     Attends meetings of clubs or organizations: Not on file     Relationship status: Not on file    Intimate partner violence     Fear of current or ex partner: Not on file     Emotionally abused: Not on file     Physically abused: Not on file     Forced sexual activity: Not on file   Other Topics Concern    Not on file   Social History Narrative    Not on file       New Prescriptions    No medications on file         Meds Prior to visit:  Prior to Visit Medications    Medication Sig Taking? Authorizing Provider   pantoprazole (PROTONIX) 40 MG tablet Take 1 tablet by mouth daily Yes Tata Holder MD   amLODIPine (NORVASC) 5 MG tablet TAKE ONE TABLET BY MOUTH DAILY Yes Tata Holder MD   lisinopril (PRINIVIL;ZESTRIL) 10 MG tablet Take 1 tablet by mouth daily Yes Tata Holder MD   Azelastine-Fluticasone [facility]) 137-50 MCG/ACT SUSP 1 spray by Nasal route 2 times daily as needed (sneezing, runny nose) Yes Tata Holder MD   montelukast (SINGULAIR) 10 MG tablet Take 1 tablet by mouth nightly  Patient not taking: Reported on 8/23/2019  Tata Holder MD   Cetirizine HCl (ZYRTEC ALLERGY) 10 MG CAPS Take 10 mg by mouth daily.   Patient not taking: Reported on 9/4/2020  Tata Holder MD     Allergies   Allergen Reactions    Aleve [Naproxen Sodium]      Hives    Hctz      Hives      Codeine     Ibuprofen      angioedema    Peanuts [Peanut Oil]      Rash         OBJECTIVE:    /89   Pulse 65   Temp 97.9 °F (36.6 °C) (Oral)   Wt 259 lb 12.8 oz (117.8 kg)   BMI 35.24 kg/m²   BP Readings from Last 2 Encounters:   09/04/20 134/89   03/20/20 (!) 138/98     Wt Readings from Last 3 Encounters:   09/04/20 259 lb 12.8 oz (117.8 kg)   03/20/20 250 lb (113.4 kg)   08/23/19 257 lb 12.8 oz (116.9 kg)       Physical Exam        Hemoglobin A1C (%)   Date Value   08/23/2019 5.6     LDL Calculated (mg/dL)   Date Value   12/03/2018 139 (H)       ASSESSMENT/PLAN:    1. Essential hypertension, benign  Controlled: Appears stable. We will continue current management and monitor for adverse reaction and disease progression. Follow-up as noted below    - Comprehensive Metabolic Panel; Future  - Lipid Panel; Future  - amLODIPine (NORVASC) 5 MG tablet; TAKE ONE TABLET BY MOUTH DAILY  Dispense: 90 tablet; Refill: 1  - lisinopril (PRINIVIL;ZESTRIL) 10 MG tablet; Take 1 tablet by mouth daily  Dispense: 90 tablet; Refill: 1    2. Gastroesophageal reflux disease without esophagitis  Controlled: Appears stable. We will continue current management and monitor for adverse reaction and disease progression. Follow-up as noted below    - pantoprazole (PROTONIX) 40 MG tablet; Take 1 tablet by mouth daily  Dispense: 90 tablet; Refill: 1    3. Morbid obesity due to excess calories ([address]  Counseled on diet and exercise. Motivational interviewing and removal of barriers for good habits. RTC No follow-ups on file. Scribe attestation:  Deniz Sandoval LPN, am scribing for and in the presence of Vick Cortes MD. Electronically signed by Margaret Daugherty LPN on 6/4/3218 at 58:64 AM            Provider attestation: Deniz Sandoval LPN, personally performed the services scribed by the user listed above in my presence, and it is both accurate and complete. I agree with the ROS and Past Histories independently gathered by the clinical support staff and the remaining scribed note accurately describes my personal service to the patient.     [facility]    9/4/2020  11:26 AM

## 2020-09-05 ENCOUNTER — TELEPHONE (OUTPATIENT)
Dept: PRIMARY CARE CLINIC | Age: 40
End: 2020-09-05

## 2020-09-05 NOTE — TELEPHONE ENCOUNTER
Please call:  His cholesterol is worse. If he would sign up for [employer] I could send him information on a diet he could employ to address that.

## 2020-09-14 RX ORDER — LISINOPRIL 10 MG/1
TABLET ORAL
Qty: 90 TABLET | Refills: 3 | Status: SHIPPED | OUTPATIENT
Start: 2020-09-14 | End: 2021-07-08 | Stop reason: SDUPTHER

## 2020-10-26 ENCOUNTER — TELEPHONE (OUTPATIENT)
Dept: PRIMARY CARE CLINIC | Age: 40
End: 2020-10-26

## 2020-10-26 DIAGNOSIS — J06.9 VIRAL URI: Primary | ICD-10-CM

## 2020-10-27 ENCOUNTER — OFFICE VISIT (OUTPATIENT)
Dept: PRIMARY CARE CLINIC | Age: 40
End: 2020-10-27
Payer: COMMERCIAL

## 2020-10-27 PROCEDURE — 99211 OFF/OP EST MAY X REQ PHY/QHP: CPT | Performed by: NURSE PRACTITIONER

## 2020-10-27 NOTE — PROGRESS NOTES
[address] [place] [address] received a viral test for COVID-19. They were educated on isolation and quarantine as appropriate. For any symptoms, they were directed to seek care from their PCP, given contact information to establish with a doctor, directed to an urgent care or the emergency room.

## 2020-10-28 LAB — SARS-COV-2, NAA: NOT DETECTED

## 2020-10-29 NOTE — RESULT ENCOUNTER NOTE

## 2021-07-08 ENCOUNTER — TELEPHONE (OUTPATIENT)
Dept: PRIMARY CARE CLINIC | Age: 41
End: 2021-07-08

## 2021-07-08 DIAGNOSIS — I10 ESSENTIAL HYPERTENSION, BENIGN: ICD-10-CM

## 2021-07-08 RX ORDER — AMLODIPINE BESYLATE 5 MG/1
TABLET ORAL
Qty: 90 TABLET | Refills: 0 | Status: SHIPPED | OUTPATIENT
Start: 2021-07-08 | End: 2021-07-08 | Stop reason: SDUPTHER

## 2021-07-08 RX ORDER — LISINOPRIL 10 MG/1
TABLET ORAL
Qty: 90 TABLET | Refills: 3 | Status: SHIPPED | OUTPATIENT
Start: 2021-07-08 | End: 2022-07-11

## 2021-07-08 RX ORDER — AMLODIPINE BESYLATE 5 MG/1
TABLET ORAL
Qty: 90 TABLET | Refills: 0 | Status: SHIPPED | OUTPATIENT
Start: 2021-07-08 | End: 2022-01-04

## 2021-07-08 NOTE — TELEPHONE ENCOUNTER
----- Message from Mallory Aguilar sent at 7/8/2021  8:50 AM EDT -----  Subject: Refill Request    QUESTIONS  Name of Medication? lisinopril (PRINIVIL;ZESTRIL) 10 MG tablet  Patient-reported dosage and instructions? 1 pill once a day  How many days do you have left? 6  [facility]  Pharmacy phone number (if available)? [phone]  Additional Information for Provider? 90 day supply  ---------------------------------------------------------------------------  --------------,  Name of Medication? amLODIPine (NORVASC) 5 MG tablet  Patient-reported dosage and instructions? 1 pill once a day  How many days do you have left? 6  Preferred Pharmacy? [place] [address]  Pharmacy phone number (if available)? [phone]  Additional Information for Provider? 90 day supply patient is leaving on   vacation tomorrow needs asap please  ---------------------------------------------------------------------------  --------------  [address]  What is the best way for the office to contact you? OK to leave message on   voicemail  Preferred Call Back Phone Number?  [phone]

## 2021-07-08 NOTE — TELEPHONE ENCOUNTER
Medication:   Requested Prescriptions     Pending Prescriptions Disp Refills    amLODIPine (NORVASC) 5 MG tablet [Pharmacy Med Name: amLODIPine BESYLATE 5 MG TAB] 90 tablet 0     Sig: TAKE ONE TABLET BY MOUTH DAILY        Last Filled:  09/04/20    Patient Phone Number: [phone] (home) [phone] (work)    Last appt: 9/4/2020   Next appt: 7/20/2021    Last OARRS: No flowsheet data found.

## 2021-07-20 ENCOUNTER — OFFICE VISIT (OUTPATIENT)
Dept: PRIMARY CARE CLINIC | Age: 41
End: 2021-07-20
Payer: COMMERCIAL

## 2021-07-20 VITALS
SYSTOLIC BLOOD PRESSURE: 138 MMHG | BODY MASS INDEX: 35.95 KG/M2 | WEIGHT: 265.4 LBS | HEIGHT: 72 IN | HEART RATE: 76 BPM | DIASTOLIC BLOOD PRESSURE: 86 MMHG

## 2021-07-20 DIAGNOSIS — I10 ESSENTIAL HYPERTENSION, BENIGN: Primary | ICD-10-CM

## 2021-07-20 DIAGNOSIS — E78.00 HYPERCHOLESTEROLEMIA: ICD-10-CM

## 2021-07-20 DIAGNOSIS — K21.9 GASTROESOPHAGEAL REFLUX DISEASE WITHOUT ESOPHAGITIS: ICD-10-CM

## 2021-07-20 DIAGNOSIS — E66.01 MORBID OBESITY DUE TO EXCESS CALORIES (HCC): ICD-10-CM

## 2021-07-20 PROCEDURE — 99214 OFFICE O/P EST MOD 30 MIN: CPT | Performed by: FAMILY MEDICINE

## 2021-07-20 ASSESSMENT — PATIENT HEALTH QUESTIONNAIRE - PHQ9
SUM OF ALL RESPONSES TO PHQ QUESTIONS 1-9: 0
SUM OF ALL RESPONSES TO PHQ9 QUESTIONS 1 & 2: 0
1. LITTLE INTEREST OR PLEASURE IN DOING THINGS: 0
SUM OF ALL RESPONSES TO PHQ QUESTIONS 1-9: 0
SUM OF ALL RESPONSES TO PHQ QUESTIONS 1-9: 0
2. FEELING DOWN, DEPRESSED OR HOPELESS: 0

## 2021-07-20 NOTE — PROGRESS NOTES
Chief Complaint   Patient presents with    Hypertension    Hyperlipidemia    Gastroesophageal Reflux       HPI: Kiara presents for evaluation and management of pretension, cholesterol, reflux and obesity. Kiara notes that he is taking and tolerating his blood pressure medicine. He denies any lightheadedness or dizziness except if he has been working very hard sweating a lot and stands up quickly from a bent over position. He has been working on his diet to manage his cholesterol. Tells me that he and his wife are eating better and making better food choices and he has lost about 8 pounds doing so over the last 2 months. He notes his reflux is better on the Protonix. If he misses a dose though he will have trouble with heartburn. He still has trouble if he eats tomatoes.     Today's PHQ:    PHQ Scores 7/20/2021 3/20/2020 2/22/2019 12/3/2018   PHQ2 Score 0 0 0 0   PHQ9 Score 0 0 0 0     Interpretation of Total Score Depression Severity: 1-4 = Minimal depression, 5-9 = Mild depression, 10-14 = Moderate depression, 15-19 = Moderately severe depression, 20-27 = Severe depression        Review of Systems    Allergies   Allergen Reactions    Aleve [Naproxen Sodium]      Hives    Hctz      Hives      Codeine     Ibuprofen      angioedema    Peanuts [Peanut Oil]      Rash       New Prescriptions    No medications on file     Current Outpatient Medications   Medication Sig Dispense Refill    lisinopril (PRINIVIL;ZESTRIL) 10 MG tablet TAKE ONE TABLET BY MOUTH DAILY 90 tablet 3    amLODIPine (NORVASC) 5 MG tablet TAKE ONE TABLET BY MOUTH DAILY 90 tablet 0    pantoprazole (PROTONIX) 40 MG tablet Take 1 tablet by mouth daily 90 tablet 1    Azelastine-Fluticasone (DYMISTA) 137-50 MCG/ACT SUSP 1 spray by Nasal route 2 times daily as needed (sneezing, runny nose) 1 Bottle 3    montelukast (SINGULAIR) 10 MG tablet Take 1 tablet by mouth nightly (Patient not taking: Reported on 8/23/2019) 30 tablet 11    Cetirizine HCl (ZYRTEC ALLERGY) 10 MG CAPS Take 10 mg by mouth daily. (Patient not taking: Reported on 9/4/2020) 30 capsule 3     No current facility-administered medications for this visit. Past Medical History:   Diagnosis Date    GERD (gastroesophageal reflux disease) 5/12/2014    Herniated lumbar disc without myelopathy     Dr. Ace Bashir HTN (hypertension) 3/20/2012    Irritable bowel syndrome     Morbid obesity due to excess calories ([address] 9/29/2016    Urticaria 2/14/2012    Eggs, milk, peanut, wheat Dr. Uma Whalen         Objective   /86 (Site: Left Upper Arm, Position: Sitting, Cuff Size: Large Adult)   Pulse 76   Ht 6' (1.829 m)   Wt 265 lb 6.4 oz (120.4 kg)   BMI 35.99 kg/m²   Wt Readings from Last 3 Encounters:   07/20/21 265 lb 6.4 oz (120.4 kg)   09/04/20 259 lb 12.8 oz (117.8 kg)   03/20/20 250 lb (113.4 kg)       Physical Exam  Constitutional:       Appearance: He is well-developed. He is obese. Cardiovascular:      Rate and Rhythm: Normal rate and regular rhythm. Heart sounds: No murmur heard. No friction rub. No gallop. Pulmonary:      Effort: Pulmonary effort is normal.      Breath sounds: Normal breath sounds. No wheezing or rales. Abdominal:      General: Bowel sounds are normal. There is no distension. Palpations: Abdomen is soft. There is no mass. Tenderness: There is no abdominal tenderness. Skin:     General: Skin is warm and dry. Findings: No rash.            Chemistry        Component Value Date/Time     09/04/2020 1318    K 4.4 09/04/2020 1318     09/04/2020 1318    CO2 23 09/04/2020 1318    BUN 12 09/04/2020 1318    CREATININE 0.9 09/04/2020 1318        Component Value Date/Time    CALCIUM 9.7 09/04/2020 1318    ALKPHOS 94 09/04/2020 1318    AST 26 09/04/2020 1318    ALT 35 09/04/2020 1318    BILITOT 0.5 09/04/2020 1318          Lab Results   Component Value Date    WBC 6.5 03/28/2012    HGB 15.4 03/28/2012    HCT 44.8 03/28/2012    MCV 88.1 03/28/2012     03/28/2012     Lab Results   Component Value Date    LABA1C 5.6 08/23/2019     No results found for: EAG  Lab Results   Component Value Date    LABA1C 5.6 08/23/2019     No components found for: CHLPL  Lab Results   Component Value Date    TRIG 103 09/04/2020    TRIG 67 12/03/2018    TRIG 161 (H) 11/17/2017     Lab Results   Component Value Date    HDL 52 09/04/2020    HDL 51 12/03/2018    HDL 56 11/17/2017     Lab Results   Component Value Date    LDLCALC 168 (H) 09/04/2020    LDLCALC 139 (H) 12/03/2018    LDLCALC 123 (H) 11/17/2017     Lab Results   Component Value Date    LABVLDL 21 09/04/2020    LABVLDL 13 12/03/2018    LABVLDL 32 11/17/2017         Assessment   Plan   1. Essential hypertension, benign    Controlled: Appears stable. We will continue current management and monitor for adverse reaction and disease progression. Follow-up as noted below    2. Hypercholesterolemia   Continue to work on diet and exercise. We will recheck his labs in September  3. Gastroesophageal reflux disease without esophagitis   Controlled: Appears stable. We will continue current management and monitor for adverse reaction and disease progression. Follow-up as noted below    4. Morbid obesity due to excess calories ([address]    Counseled on diet and exercise. Motivational interviewing and removal of barriers for good habits. Kiara received counseling on the following healthy behaviors: nutrition and exercise    Patient given educational materials on Nutrition and Exercise  Discussed use, benefit, and side effects of prescribed medications. Barriers to medication compliance addressed. All patient questions answered. Pt voiced understanding. RTC Return in about 3 months (around 10/20/2021).

## 2021-07-20 NOTE — PATIENT INSTRUCTIONS
To work on better and longer life  1) determine your health goals for the year  2) determine what changes you need to achieve those goals  3) design your daily routine, shopping habits etc to implement those changes  => Default Right Action (no choices)  4) I invite you to send me your plans via [employer] so I can continue to help you with them    Examine your lifestyle and the barriers to bad and good habits and how you can design your life to make better choices    If you want to feel better these are the FUNDAMENTAL PILLARS of Wellness:    Make it EASY to do the [address]. 1)  You can choose to Get 150 min/week of moderate exercise (can talk but can't sing) or 75 min/week of vigorous exercise (can't talk)   This will enhance your sense of well being (Exercise is as good as medicine for depression.)    2)  You can choose to Get 7-9 hours of sleep per night    Detoxifies your brain, reduces risk of dementia    3)  You can choose to Strength Train 2 x a week on non-consecutive days   This will improve function and reduce risk of injury. Body weight type exercises such as Yoga and Pilates are good    4)  You can choose good nutrition. Only eat your goal weight (in lbs) x 10 calories/day and get 5 servings of Vegetables/day   Plant based diets reduce risk of heart attack/stroke and will help you feel full on less food. Avoid highly processed foods and processed carbohydrates. 5)  You can choose moderate alcohol intake < 1-2 drinks/day   Alcohol will disrupt your sleep and add calories to your day    6)  You can choose to develop a Charismatic/Supportive relationship. This will strengthen your resilience for the ups and downs. 7)  You can choose to Practice Mindfulness. An hour a day of prayer/meditation/gratitude will change your life! If you are trying to lose weight, here are some recommendations for weight loss:  Not every weight loss program is appropriate for everybody. ..  good online

## 2021-10-25 ENCOUNTER — OFFICE VISIT (OUTPATIENT)
Dept: PRIMARY CARE CLINIC | Age: 41
End: 2021-10-25
Payer: COMMERCIAL

## 2021-10-25 VITALS
BODY MASS INDEX: 35.62 KG/M2 | DIASTOLIC BLOOD PRESSURE: 76 MMHG | WEIGHT: 263 LBS | SYSTOLIC BLOOD PRESSURE: 116 MMHG | HEIGHT: 72 IN | HEART RATE: 64 BPM

## 2021-10-25 DIAGNOSIS — E66.01 MORBID OBESITY DUE TO EXCESS CALORIES (HCC): ICD-10-CM

## 2021-10-25 DIAGNOSIS — K21.9 GASTROESOPHAGEAL REFLUX DISEASE WITHOUT ESOPHAGITIS: ICD-10-CM

## 2021-10-25 DIAGNOSIS — Z23 NEED FOR VACCINATION: ICD-10-CM

## 2021-10-25 DIAGNOSIS — I10 ESSENTIAL HYPERTENSION, BENIGN: Primary | ICD-10-CM

## 2021-10-25 DIAGNOSIS — E78.00 HYPERCHOLESTEROLEMIA: ICD-10-CM

## 2021-10-25 PROCEDURE — 90674 CCIIV4 VAC NO PRSV 0.5 ML IM: CPT | Performed by: FAMILY MEDICINE

## 2021-10-25 PROCEDURE — 99214 OFFICE O/P EST MOD 30 MIN: CPT | Performed by: FAMILY MEDICINE

## 2021-10-25 PROCEDURE — 90471 IMMUNIZATION ADMIN: CPT | Performed by: FAMILY MEDICINE

## 2021-10-25 RX ORDER — PANTOPRAZOLE SODIUM 40 MG/1
40 TABLET, DELAYED RELEASE ORAL DAILY
Qty: 90 TABLET | Refills: 1 | Status: SHIPPED | OUTPATIENT
Start: 2021-10-25 | End: 2022-04-25 | Stop reason: SDUPTHER

## 2021-10-25 NOTE — PATIENT INSTRUCTIONS
You will want to call the lab before you go in to see if you can get your labs at any particular site. You should be able to get your labs at one of the following locations:    My office building at [facility] [phone]  Open 7:30-3:30. Or one of these locations if more convenient:  [employer]     [address] [place] [address], [address] [place] [address]    M-F 7a-6p;   8a-12p  [phone]    [address]      M-F 9a-7:65p  [address] [facility]    [address] [facility] 24    -F 7a-5p [phone]    [facility]  [address]  M-F 7a-5p[address]  [phone]

## 2021-10-25 NOTE — PROGRESS NOTES
Chief Complaint   Patient presents with    Hypertension    Gastroesophageal Reflux    Obesity       HPI: Kiara presents for evaluation and management of hypertension, reflux and obesity. Kiara returns to clinic noting that he is taking and tolerating his blood pressure medicine. Denies lightheadedness or dizziness. He reports he is taking tolerating his Protonix. He uses it more on an as-needed basis as his symptoms are pretty controlled when he follows his diet. He states he is running low and would need a refill. He denies food sticking in his throat or unintentional weight loss. He has been working on trying to follow a better diet. Has cut out sweet drinks and is drinking water instead of that. Review of Systems    Allergies   Allergen Reactions    Aleve [Naproxen Sodium]      Hives    Hctz      Hives      Codeine     Ibuprofen      angioedema    Peanuts [Peanut Oil]      Rash       New Prescriptions    No medications on file     Current Outpatient Medications   Medication Sig Dispense Refill    pantoprazole (PROTONIX) 40 MG tablet Take 1 tablet by mouth daily 90 tablet 1    lisinopril (PRINIVIL;ZESTRIL) 10 MG tablet TAKE ONE TABLET BY MOUTH DAILY 90 tablet 3    amLODIPine (NORVASC) 5 MG tablet TAKE ONE TABLET BY MOUTH DAILY 90 tablet 0    Azelastine-Fluticasone (DYMISTA) 137-50 MCG/ACT SUSP 1 spray by Nasal route 2 times daily as needed (sneezing, runny nose) 1 Bottle 3    montelukast (SINGULAIR) 10 MG tablet Take 1 tablet by mouth nightly (Patient not taking: Reported on 8/23/2019) 30 tablet 11    Cetirizine HCl (ZYRTEC ALLERGY) 10 MG CAPS Take 10 mg by mouth daily. (Patient not taking: Reported on 9/4/2020) 30 capsule 3     No current facility-administered medications for this visit.        Past Medical History:   Diagnosis Date    GERD (gastroesophageal reflux disease) 5/12/2014    Herniated lumbar disc without myelopathy     Dr. Yomaira Hayward    HTN (hypertension) 3/20/2012    Irritable bowel syndrome     Morbid obesity due to excess calories ([address] 9/29/2016    Urticaria 2/14/2012    Eggs, milk, peanut, wheat Dr. Earnest Flor         Objective   /76   Pulse 64   Ht 6' (1.829 m)   Wt 263 lb (119.3 kg)   BMI 35.67 kg/m²   Wt Readings from Last 3 Encounters:   10/25/21 263 lb (119.3 kg)   07/20/21 265 lb 6.4 oz (120.4 kg)   09/04/20 259 lb 12.8 oz (117.8 kg)       Physical Exam  Constitutional:       Appearance: He is well-developed. Cardiovascular:      Rate and Rhythm: Normal rate and regular rhythm. Pulses:           Dorsalis pedis pulses are 2+ on the right side and 2+ on the left side. Posterior tibial pulses are 2+ on the right side and 2+ on the left side. Heart sounds: No murmur heard. No friction rub. No gallop. Comments: No Lower Extremity Edema  Pulmonary:      Effort: Pulmonary effort is normal.      Breath sounds: Normal breath sounds. No wheezing or rales. Abdominal:      General: Bowel sounds are normal. There is no distension. Palpations: Abdomen is soft. There is no mass. Tenderness: There is no abdominal tenderness. Skin:     General: Skin is warm and dry. Findings: No rash.            Chemistry        Component Value Date/Time     09/04/2020 1318    K 4.4 09/04/2020 1318     09/04/2020 1318    CO2 23 09/04/2020 1318    BUN 12 09/04/2020 1318    CREATININE 0.9 09/04/2020 1318        Component Value Date/Time    CALCIUM 9.7 09/04/2020 1318    ALKPHOS 94 09/04/2020 1318    AST 26 09/04/2020 1318    ALT 35 09/04/2020 1318    BILITOT 0.5 09/04/2020 1318          Lab Results   Component Value Date    WBC 6.5 03/28/2012    HGB 15.4 03/28/2012    HCT 44.8 03/28/2012    MCV 88.1 03/28/2012     03/28/2012     Lab Results   Component Value Date    LABA1C 5.6 08/23/2019     No results found for: EAG  Lab Results   Component Value Date    LABA1C 5.6 08/23/2019     No components found for: CHLPL  Lab

## 2021-10-27 DIAGNOSIS — E78.00 HYPERCHOLESTEROLEMIA: ICD-10-CM

## 2021-10-27 DIAGNOSIS — I10 ESSENTIAL HYPERTENSION, BENIGN: ICD-10-CM

## 2021-10-27 LAB
A/G RATIO: 1.7 (ref 1.1–2.2)
ALBUMIN SERPL-MCNC: 5 G/DL (ref 3.4–5)
ALP BLD-CCNC: 92 U/L (ref 40–129)
ALT SERPL-CCNC: 38 U/L (ref 10–40)
ANION GAP SERPL CALCULATED.3IONS-SCNC: 16 MMOL/L (ref 3–16)
AST SERPL-CCNC: 30 U/L (ref 15–37)
BILIRUB SERPL-MCNC: 0.4 MG/DL (ref 0–1)
BUN BLDV-MCNC: 12 MG/DL (ref 7–20)
CALCIUM SERPL-MCNC: 9.6 MG/DL (ref 8.3–10.6)
CHLORIDE BLD-SCNC: 100 MMOL/L (ref 99–110)
CHOLESTEROL, TOTAL: 221 MG/DL (ref 0–199)
CO2: 22 MMOL/L (ref 21–32)
CREAT SERPL-MCNC: 1 MG/DL (ref 0.9–1.3)
GFR AFRICAN AMERICAN: >60
GFR NON-AFRICAN AMERICAN: >60
GLOBULIN: 3 G/DL
GLUCOSE BLD-MCNC: 82 MG/DL (ref 70–99)
HDLC SERPL-MCNC: 48 MG/DL (ref 40–60)
LDL CHOLESTEROL CALCULATED: 157 MG/DL
POTASSIUM SERPL-SCNC: 4.4 MMOL/L (ref 3.5–5.1)
SODIUM BLD-SCNC: 138 MMOL/L (ref 136–145)
TOTAL PROTEIN: 8 G/DL (ref 6.4–8.2)
TRIGL SERPL-MCNC: 81 MG/DL (ref 0–150)
VLDLC SERPL CALC-MCNC: 16 MG/DL

## 2022-01-18 ENCOUNTER — TELEPHONE (OUTPATIENT)
Dept: PRIMARY CARE CLINIC | Age: 42
End: 2022-01-18

## 2022-01-18 NOTE — TELEPHONE ENCOUNTER
Pt tested positive for covid on the 2nd of Jan. Pt is still showing signs and would like advice on what to do next.

## 2022-01-19 ENCOUNTER — VIRTUAL VISIT (OUTPATIENT)
Dept: PRIMARY CARE CLINIC | Age: 42
End: 2022-01-19
Payer: COMMERCIAL

## 2022-01-19 DIAGNOSIS — J02.0 STREP THROAT: Primary | ICD-10-CM

## 2022-01-19 PROCEDURE — 99213 OFFICE O/P EST LOW 20 MIN: CPT | Performed by: FAMILY MEDICINE

## 2022-01-19 RX ORDER — METHYLPREDNISOLONE 4 MG/1
TABLET ORAL
Qty: 1 KIT | Refills: 0 | Status: SHIPPED | OUTPATIENT
Start: 2022-01-19 | End: 2022-01-25

## 2022-01-19 RX ORDER — AZITHROMYCIN 250 MG/1
250 TABLET, FILM COATED ORAL SEE ADMIN INSTRUCTIONS
Qty: 6 TABLET | Refills: 0 | Status: SHIPPED | OUTPATIENT
Start: 2022-01-19 | End: 2022-01-24

## 2022-01-19 NOTE — PROGRESS NOTES
2022    TELEHEALTH EVALUATION -- Audio/Visual (During XPRDH-15 public health emergency)    HPI:    [facility] (:  1980) has requested an audio/video evaluation for the following concern(s):    Sore throat. Kiara notes that he tested positive for COVID on . He seemed to recover uneventfully over the following week and went back to work. He now notes a several day history of severe sore throat with odynophagia. He notes headache but no fever or rash. He also notes he is having some purulent and blood-tinged nasal mucus. Review of Systems    Prior to Visit Medications    Medication Sig Taking? Authorizing Provider   amLODIPine (NORVASC) 5 MG tablet TAKE ONE TABLET BY MOUTH DAILY  Savanna Doran MD   pantoprazole (PROTONIX) 40 MG tablet Take 1 tablet by mouth daily  Savanna Doran MD   lisinopril (PRINIVIL;ZESTRIL) 10 MG tablet TAKE ONE TABLET BY MOUTH DAILY  Savanna Doran MD   montelukast (SINGULAIR) 10 MG tablet Take 1 tablet by mouth nightly  Patient not taking: Reported on 2019  Savanna Doran MD   Azelastine-Fluticasone [facility]) 137-50 MCG/ACT SUSP 1 spray by Nasal route 2 times daily as needed (sneezing, runny nose)  Savanna Doran MD   Cetirizine HCl (ZYRTEC ALLERGY) 10 MG CAPS Take 10 mg by mouth daily. Patient not taking: Reported on 2020  Savanna Doran MD       Social History     Tobacco Use    Smoking status: Never Smoker    Smokeless tobacco: Never Used   Substance Use Topics    Alcohol use: Yes     Comment: weekends    Drug use: Not on file            PHYSICAL EXAMINATION:  There were no vitals taken for this visit.   Wt Readings from Last 3 Encounters:   10/25/21 263 lb (119.3 kg)   21 265 lb 6.4 oz (120.4 kg)   20 259 lb 12.8 oz (117.8 kg)       [ INSTRUCTIONS:  \"[x]\" Indicates a positive item  \"[]\" Indicates a negative item  -- DELETE ALL ITEMS NOT EXAMINED]  [x] Alert  [x] Oriented to person/place/time    [x] No apparent distress  []  Appears Unwell:   Positive beefy red and swollen pharynx  [x] Breathing appears normal  [] Appears tachypneic      [] Rash on visible skin:    [] Cranial Nerves II-XII grossly intact    [] Motor grossly intact in visible upper extremities    [] Motor grossly intact in visible lower extremities    [] Normal Mood  [] Anxious appearing    [] Depressed appearing     [] OTHER:      Due to this being a TeleHealth encounter, evaluation of the following organ systems is limited: Vitals/Constitutional/EENT/Resp/CV/GI//MS/Neuro/Skin/Heme-Lymph-Imm. Lab Results   Component Value Date     10/27/2021    K 4.4 10/27/2021     10/27/2021    CO2 22 10/27/2021    BUN 12 10/27/2021    CREATININE 1.0 10/27/2021    GLUCOSE 82 10/27/2021    CALCIUM 9.6 10/27/2021    PROT 8.0 10/27/2021    LABALBU 5.0 10/27/2021    BILITOT 0.4 10/27/2021    ALKPHOS 92 10/27/2021    AST 30 10/27/2021    ALT 38 10/27/2021    LABGLOM >60 10/27/2021    GFRAA >60 10/27/2021    AGRATIO 1.7 10/27/2021    GLOB 3.0 10/27/2021     Lab Results   Component Value Date    LABA1C 5.6 08/23/2019     No results found for: EAG      ASSESSMENT/PLAN:  1. Strep throat  We will treat with steroids and antibiotics and follow-up if not improving in 2 days  - methylPREDNISolone (MEDROL DOSEPACK) 4 MG tablet; Take by mouth. Dispense: 1 kit; Refill: 0  - azithromycin (ZITHROMAX) 250 MG tablet; Take 1 tablet by mouth See Admin Instructions for 5 days 500mg on day 1 followed by 250mg on days 2 - 5  Dispense: 6 tablet; Refill: 0      No follow-ups on file.     An  electronic signature was used to authenticate this note.    --Merari Tesfaye MD on 1/19/2022 at 6:58 AM        Pursuant to the emergency declaration under the [address] [place] [address], [address] [place] [address] authority and the [employer] Act, this Virtual  Visit was conducted, with patient's consent, to reduce the patient's risk of exposure to COVID-19 and provide continuity of care for an established patient. Services were provided through a video synchronous discussion virtually to substitute for in-person clinic visit.

## 2022-04-25 ENCOUNTER — OFFICE VISIT (OUTPATIENT)
Dept: PRIMARY CARE CLINIC | Age: 42
End: 2022-04-25
Payer: COMMERCIAL

## 2022-04-25 VITALS
DIASTOLIC BLOOD PRESSURE: 82 MMHG | HEIGHT: 72 IN | BODY MASS INDEX: 36.57 KG/M2 | WEIGHT: 270 LBS | SYSTOLIC BLOOD PRESSURE: 129 MMHG | TEMPERATURE: 98.1 F

## 2022-04-25 DIAGNOSIS — K21.9 GASTROESOPHAGEAL REFLUX DISEASE WITHOUT ESOPHAGITIS: ICD-10-CM

## 2022-04-25 DIAGNOSIS — I10 ESSENTIAL HYPERTENSION, BENIGN: Primary | ICD-10-CM

## 2022-04-25 DIAGNOSIS — J30.1 SEASONAL ALLERGIC RHINITIS DUE TO POLLEN: ICD-10-CM

## 2022-04-25 PROCEDURE — 99214 OFFICE O/P EST MOD 30 MIN: CPT | Performed by: FAMILY MEDICINE

## 2022-04-25 RX ORDER — PANTOPRAZOLE SODIUM 40 MG/1
40 TABLET, DELAYED RELEASE ORAL DAILY
Qty: 90 TABLET | Refills: 1 | Status: SHIPPED | OUTPATIENT
Start: 2022-04-25 | End: 2022-11-01 | Stop reason: SDUPTHER

## 2022-04-25 SDOH — ECONOMIC STABILITY: FOOD INSECURITY: WITHIN THE PAST 12 MONTHS, THE FOOD YOU BOUGHT JUST DIDN'T LAST AND YOU DIDN'T HAVE MONEY TO GET MORE.: NEVER TRUE

## 2022-04-25 SDOH — ECONOMIC STABILITY: FOOD INSECURITY: WITHIN THE PAST 12 MONTHS, YOU WORRIED THAT YOUR FOOD WOULD RUN OUT BEFORE YOU GOT MONEY TO BUY MORE.: NEVER TRUE

## 2022-04-25 ASSESSMENT — SOCIAL DETERMINANTS OF HEALTH (SDOH): HOW HARD IS IT FOR YOU TO PAY FOR THE VERY BASICS LIKE FOOD, HOUSING, MEDICAL CARE, AND HEATING?: NOT HARD AT ALL

## 2022-04-25 NOTE — PROGRESS NOTES
Chief Complaint   Patient presents with    Follow-up       HPI: Kiara  presents for evaluation and management of hypertension, reflux and allergies. Kiara notes he has been taking tolerating his blood pressure medicine without lightheadedness or dizziness. He reports his reflux is pretty well controlled so long as he takes his Protonix and eats right. He has been using his Protonix every night. He does not have any food sticking in his throat. He notes his allergies are worse. He states he likes Rhinocort the best but has not been able to find it. He is having some runny nose and sneezing           Review of Systems    Allergies   Allergen Reactions    Aleve [Naproxen Sodium]      Hives    Hctz      Hives      Codeine     Ibuprofen      angioedema    Peanuts [Peanut Oil]      Rash       New Prescriptions    BUDESONIDE (RHINOCORT ALLERGY) 32 MCG/ACT NASAL SPRAY    1 spray by Each Nostril route daily     Current Outpatient Medications   Medication Sig Dispense Refill    pantoprazole (PROTONIX) 40 MG tablet Take 1 tablet by mouth daily 90 tablet 1    budesonide (RHINOCORT ALLERGY) 32 MCG/ACT nasal spray 1 spray by Each Nostril route daily 3 each 3    amLODIPine (NORVASC) 5 MG tablet TAKE ONE TABLET BY MOUTH DAILY 90 tablet 1    lisinopril (PRINIVIL;ZESTRIL) 10 MG tablet TAKE ONE TABLET BY MOUTH DAILY 90 tablet 3    montelukast (SINGULAIR) 10 MG tablet Take 1 tablet by mouth nightly (Patient not taking: Reported on 8/23/2019) 30 tablet 11    Cetirizine HCl (ZYRTEC ALLERGY) 10 MG CAPS Take 10 mg by mouth daily. (Patient not taking: Reported on 9/4/2020) 30 capsule 3     No current facility-administered medications for this visit.        Past Medical History:   Diagnosis Date    COVID-19 01/02/2022    GERD (gastroesophageal reflux disease) 05/12/2014    Herniated lumbar disc without myelopathy     Dr. Pato Vergara HTN (hypertension) 03/20/2012    Irritable bowel syndrome     [facility] to [facility] 09/29/2016    Urticaria 02/14/2012    Eggs, milk, peanut, wheat Dr. Patel Needle         Objective   /82   Temp 98.1 °F (36.7 °C)   Ht 6' (1.829 m)   Wt 270 lb (122.5 kg)   BMI 36.62 kg/m²   Wt Readings from Last 3 Encounters:   04/25/22 270 lb (122.5 kg)   10/25/21 263 lb (119.3 kg)   07/20/21 265 lb 6.4 oz (120.4 kg)       Physical Exam  Constitutional:       Appearance: He is well-developed. He is obese. Cardiovascular:      Rate and Rhythm: Normal rate and regular rhythm. Heart sounds: No murmur heard. No friction rub. No gallop. Pulmonary:      Effort: Pulmonary effort is normal.      Breath sounds: Normal breath sounds. No wheezing or rales. Abdominal:      General: Bowel sounds are normal. There is no distension. Palpations: Abdomen is soft. There is no mass. Tenderness: There is no abdominal tenderness. Skin:     General: Skin is warm and dry. Findings: No rash.            Chemistry        Component Value Date/Time     10/27/2021 1652    K 4.4 10/27/2021 1652     10/27/2021 1652    CO2 22 10/27/2021 1652    BUN 12 10/27/2021 1652    CREATININE 1.0 10/27/2021 1652        Component Value Date/Time    CALCIUM 9.6 10/27/2021 1652    ALKPHOS 92 10/27/2021 1652    AST 30 10/27/2021 1652    ALT 38 10/27/2021 1652    BILITOT 0.4 10/27/2021 1652          Lab Results   Component Value Date    WBC 6.5 03/28/2012    HGB 15.4 03/28/2012    HCT 44.8 03/28/2012    MCV 88.1 03/28/2012     03/28/2012     Lab Results   Component Value Date    LABA1C 5.6 08/23/2019     No results found for: EAG  Lab Results   Component Value Date    LABA1C 5.6 08/23/2019     No components found for: CHLPL  Lab Results   Component Value Date    TRIG 81 10/27/2021    TRIG 103 09/04/2020    TRIG 67 12/03/2018     Lab Results   Component Value Date    HDL 48 10/27/2021    HDL 52 09/04/2020    HDL 51 12/03/2018     Lab Results   Component Value Date    LDLCALC 157 (H) 10/27/2021    LDLCALC 168 (H) 09/04/2020    LDLCALC 139 (H) 12/03/2018     Lab Results   Component Value Date    LABVLDL 16 10/27/2021    LABVLDL 21 09/04/2020    LABVLDL 13 12/03/2018         Assessment   Plan   1. Essential hypertension, benign  Controlled: Appears stable. We will continue current management and monitor for adverse reaction and disease progression. Follow-up as noted below      2. Gastroesophageal reflux disease without esophagitis  Appears fairly well controlled. Counseled patient to try to minimize his use of pantoprazole and use it on an as-needed basis. Gave him lifestyle interventions he could employ to do so. Follow-up 6 months  - pantoprazole (PROTONIX) 40 MG tablet; Take 1 tablet by mouth daily  Dispense: 90 tablet; Refill: 1    3. Seasonal allergic rhinitis due to pollen  We will trial Rhinocort. Sent in a prescription as requested and follow-up in 6 months  - budesonide (RHINOCORT ALLERGY) 32 MCG/ACT nasal spray; 1 spray by Each Nostril route daily  Dispense: 3 each; Refill: 3        Discussed use, benefit, and side effects of prescribed medications. Barriers to medication compliance addressed. All patient questions answered. Pt voiced understanding. RTC Return in about 6 months (around 10/25/2022).

## 2022-04-25 NOTE — PATIENT INSTRUCTIONS
Patient Education        Gastroesophageal Reflux Disease (GERD): Care Instructions  Overview     Gastroesophageal reflux disease (GERD) is the backward flow of stomach acid into the esophagus. The esophagus is the tube that leads from your throat to your stomach. A one-way valve prevents the stomach acid from backing up into this tube. But when you have GERD, this valve does not close tightly enough. This can also cause pain and swelling in your esophagus. (This is calledesophagitis.)  If you have mild GERD symptoms including heartburn, you may be able to control the problem with antacids or over-the-counter medicine. You can also make lifestyle changes to help reduce your symptoms. These include changing yourdiet and eating habits, such as not eating late at night and losing weight. Follow-up care is a key part of your treatment and safety. Be sure to make and go to all appointments, and call your doctor if you are having problems. It's also a good idea to know your test results and keep alist of the medicines you take. How can you care for yourself at home?  Take your medicines exactly as prescribed. Call your doctor if you think you are having a problem with your medicine.  Your doctor may recommend over-the-counter medicine. For mild or occasional indigestion, antacids, such as Tums, Gaviscon, Mylanta, or Maalox, may help. Your doctor also may recommend over-the-counter acid reducers, such as famotidine (Pepcid AC), cimetidine (Tagamet HB), or omeprazole (Prilosec). Read and follow all instructions on the label. If you use these medicines often, talk with your doctor.  Change your eating habits. ? It's best to eat several small meals instead of two or three large meals. ? After you eat, wait 2 to 3 hours before you lie down. ? Avoid foods that make your symptoms worse.  These may include chocolate, mint, alcohol, pepper, spicy foods, high-fat foods, or drinks with caffeine in them, such as tea, coffee, marlin, or energy drinks. If your symptoms are worse after you eat a certain food, you may want to stop eating it to see if your symptoms get better.  Do not smoke or chew tobacco. Smoking can make GERD worse. If you need help quitting, talk to your doctor about stop-smoking programs and medicines. These can increase your chances of quitting for good.  If you have GERD symptoms at night, raise the head of your bed 6 to 8 inches by putting the frame on blocks or placing a foam wedge under the head of your mattress. (Adding extra pillows does not work.)   Do not wear tight clothing around your middle.  Lose weight if you need to. Losing just 5 to 10 pounds can help. When should you call for help? Call your doctor now or seek immediate medical care if:     You have new or different belly pain.      Your stools are black and tarlike or have streaks of blood. Watch closely for changes in your health, and be sure to contact your doctor if:     Your symptoms have not improved after 2 days.      Food seems to catch in your throat or chest.   Where can you learn more? Go to https://[employer]. org and sign in to your [facility] account. Enter N769 in the [facility] box to learn more about \"Gastroesophageal Reflux Disease (GERD): Care Instructions. \"     If you do not have an account, please click on the \"Sign Up Now\" link. Current as of: September 8, 2021               Content Version: 13.2  © 0065-5276 Healthwise, [facility]. Care instructions adapted under license by [facility] ([facility]). If you have questions about a medical condition or this instruction, always ask your healthcare professional. [place] [address] any warranty or liability for your use of this information.

## 2022-07-11 DIAGNOSIS — I10 ESSENTIAL HYPERTENSION, BENIGN: ICD-10-CM

## 2022-07-11 RX ORDER — LISINOPRIL 10 MG/1
TABLET ORAL
Qty: 90 TABLET | Refills: 3 | Status: SHIPPED | OUTPATIENT
Start: 2022-07-11

## 2022-07-11 RX ORDER — AMLODIPINE BESYLATE 5 MG/1
TABLET ORAL
Qty: 90 TABLET | Refills: 1 | Status: SHIPPED | OUTPATIENT
Start: 2022-07-11

## 2022-11-01 ENCOUNTER — OFFICE VISIT (OUTPATIENT)
Dept: PRIMARY CARE CLINIC | Age: 42
End: 2022-11-01
Payer: COMMERCIAL

## 2022-11-01 VITALS
WEIGHT: 266 LBS | TEMPERATURE: 97.3 F | OXYGEN SATURATION: 99 % | BODY MASS INDEX: 36.03 KG/M2 | SYSTOLIC BLOOD PRESSURE: 135 MMHG | HEART RATE: 71 BPM | DIASTOLIC BLOOD PRESSURE: 86 MMHG | HEIGHT: 72 IN

## 2022-11-01 DIAGNOSIS — K21.9 GASTROESOPHAGEAL REFLUX DISEASE WITHOUT ESOPHAGITIS: ICD-10-CM

## 2022-11-01 DIAGNOSIS — Z23 NEED FOR VACCINATION: ICD-10-CM

## 2022-11-01 DIAGNOSIS — R53.82 CHRONIC FATIGUE: ICD-10-CM

## 2022-11-01 DIAGNOSIS — I10 ESSENTIAL HYPERTENSION, BENIGN: Primary | ICD-10-CM

## 2022-11-01 DIAGNOSIS — E66.01 MORBID OBESITY DUE TO EXCESS CALORIES (HCC): ICD-10-CM

## 2022-11-01 PROCEDURE — 3078F DIAST BP <80 MM HG: CPT | Performed by: FAMILY MEDICINE

## 2022-11-01 PROCEDURE — 3074F SYST BP LT 130 MM HG: CPT | Performed by: FAMILY MEDICINE

## 2022-11-01 PROCEDURE — 99214 OFFICE O/P EST MOD 30 MIN: CPT | Performed by: FAMILY MEDICINE

## 2022-11-01 RX ORDER — PANTOPRAZOLE SODIUM 40 MG/1
40 TABLET, DELAYED RELEASE ORAL DAILY
Qty: 90 TABLET | Refills: 1 | Status: SHIPPED | OUTPATIENT
Start: 2022-11-01 | End: 2023-11-01

## 2022-11-01 ASSESSMENT — PATIENT HEALTH QUESTIONNAIRE - PHQ9
SUM OF ALL RESPONSES TO PHQ QUESTIONS 1-9: 0
SUM OF ALL RESPONSES TO PHQ QUESTIONS 1-9: 0
2. FEELING DOWN, DEPRESSED OR HOPELESS: 0
1. LITTLE INTEREST OR PLEASURE IN DOING THINGS: 0
SUM OF ALL RESPONSES TO PHQ QUESTIONS 1-9: 0
SUM OF ALL RESPONSES TO PHQ9 QUESTIONS 1 & 2: 0
SUM OF ALL RESPONSES TO PHQ QUESTIONS 1-9: 0

## 2022-11-01 NOTE — PROGRESS NOTES
Chief Complaint   Patient presents with    Hypertension    Gastroesophageal Reflux       HPI: Kiara  presents for evaluation and management of hypertension, reflux, obesity and fatigue. Kiara notes he has been taking tolerating his blood pressure medicine without lightheadedness or dizziness. He reports his Protonix keeps his reflux and heartburn under control. If he misses a dose he has heartburn the next day. He notes he has been working on his diet. Trying to cut back on his carbs. Made some progress but relapsed over the last couple months. He reports he is tired and having trouble staying awake while driving at times. Notes he does snore    Today's PHQ:    PHQ Scores 11/1/2022 7/20/2021 3/20/2020 2/22/2019 12/3/2018   PHQ2 Score 0 0 0 0 0   PHQ9 Score 0 0 0 0 0     Interpretation of Total Score Depression Severity: 1-4 = Minimal depression, 5-9 = Mild depression, 10-14 = Moderate depression, 15-19 = Moderately severe depression, 20-27 = Severe depression        Review of Systems    Allergies   Allergen Reactions    Aleve [Naproxen Sodium]      Hives    Hctz      Hives      Codeine     Ibuprofen      angioedema    Peanuts [Peanut Oil]      Rash       New Prescriptions    No medications on file     Current Outpatient Medications   Medication Sig Dispense Refill    pantoprazole (PROTONIX) 40 MG tablet Take 1 tablet by mouth daily 90 tablet 1    lisinopril (PRINIVIL;ZESTRIL) 10 MG tablet TAKE ONE TABLET BY MOUTH DAILY 90 tablet 3    amLODIPine (NORVASC) 5 MG tablet TAKE ONE TABLET BY MOUTH DAILY 90 tablet 1    budesonide (RHINOCORT ALLERGY) 32 MCG/ACT nasal spray 1 spray by Each Nostril route daily 3 each 3     No current facility-administered medications for this visit.        Past Medical History:   Diagnosis Date    COVID-19 01/02/2022    GERD (gastroesophageal reflux disease) 05/12/2014    Herniated lumbar disc without myelopathy     Dr. Sandy Larkin    HTN (hypertension) 03/20/2012    Irritable bowel syndrome     Morbid obesity due to excess calories ([facility] 09/29/2016    Urticaria 02/14/2012    Eggs, milk, peanut, wheat Dr. Georgi Myles         Objective   /86   Pulse 71   Temp 97.3 °F (36.3 °C)   Ht 6' (1.829 m)   Wt 266 lb (120.7 kg)   SpO2 99%   BMI 36.08 kg/m²   Wt Readings from Last 3 Encounters:   11/01/22 266 lb (120.7 kg)   04/25/22 270 lb (122.5 kg)   10/25/21 263 lb (119.3 kg)       Physical Exam  Constitutional:       Appearance: He is well-developed. He is obese. Cardiovascular:      Rate and Rhythm: Normal rate and regular rhythm. Pulses:           Dorsalis pedis pulses are 2+ on the right side and 2+ on the left side. Posterior tibial pulses are 2+ on the right side and 2+ on the left side. Heart sounds: No murmur heard. No friction rub. No gallop. Comments: No Lower Extremity Edema  Pulmonary:      Effort: Pulmonary effort is normal.      Breath sounds: Normal breath sounds. No wheezing or rales. Abdominal:      General: Bowel sounds are normal. There is no distension. Palpations: Abdomen is soft. There is no mass. Tenderness: There is no abdominal tenderness. Skin:     General: Skin is warm and dry. Findings: No rash.          Chemistry        Component Value Date/Time     10/27/2021 1652    K 4.4 10/27/2021 1652     10/27/2021 1652    CO2 22 10/27/2021 1652    BUN 12 10/27/2021 1652    CREATININE 1.0 10/27/2021 1652        Component Value Date/Time    CALCIUM 9.6 10/27/2021 1652    ALKPHOS 92 10/27/2021 1652    AST 30 10/27/2021 1652    ALT 38 10/27/2021 1652    BILITOT 0.4 10/27/2021 1652          Lab Results   Component Value Date    WBC 6.5 03/28/2012    HGB 15.4 03/28/2012    HCT 44.8 03/28/2012    MCV 88.1 03/28/2012     03/28/2012     Lab Results   Component Value Date    LABA1C 5.6 08/23/2019     No results found for: EAG  Lab Results   Component Value Date    LABA1C 5.6 08/23/2019     No components found for: CHLPL  Lab Results   Component Value Date    TRIG 81 10/27/2021    TRIG 103 09/04/2020    TRIG 67 12/03/2018     Lab Results   Component Value Date    HDL 48 10/27/2021    HDL 52 09/04/2020    HDL 51 12/03/2018     Lab Results   Component Value Date    LDLCALC 157 (H) 10/27/2021    LDLCALC 168 (H) 09/04/2020    LDLCALC 139 (H) 12/03/2018     Lab Results   Component Value Date    LABVLDL 16 10/27/2021    LABVLDL 21 09/04/2020    LABVLDL 13 12/03/2018         Assessment   Plan   1. Essential hypertension, benign  Controlled: Appears stable. We will continue current management and monitor for adverse reaction and disease progression. Follow-up as noted below    - Comprehensive Metabolic Panel; Future  - Lipid Panel; Future    2. Gastroesophageal reflux disease without esophagitis  Controlled: Appears stable. We will continue current management and monitor for adverse reaction and disease progression. Follow-up as noted below    - pantoprazole (PROTONIX) 40 MG tablet; Take 1 tablet by mouth daily  Dispense: 90 tablet; Refill: 1    3. Morbid obesity due to excess calories ([address]  Counseled on diet and exercise. Motivational interviewing and removal of barriers for good habits. 4. Need for vaccination    - Influenza, FLUCELVAX, (age 10 mo+), IM, MDV, 0.5 mL    5. Chronic fatigue  Concerning for sleep apnea. We will refer to Dr. Naeem Oneill and follow-up in 6 months  - Ilan Oglesby MD, Sleep Medicine, [facility]        RTC Return in about 6 months (around 5/1/2023).

## 2022-11-14 PROCEDURE — 90471 IMMUNIZATION ADMIN: CPT | Performed by: FAMILY MEDICINE

## 2022-11-14 PROCEDURE — 90756 CCIIV4 VACC ABX FREE IM: CPT | Performed by: FAMILY MEDICINE

## 2022-12-12 ENCOUNTER — TELEPHONE (OUTPATIENT)
Dept: PRIMARY CARE CLINIC | Age: 42
End: 2022-12-12

## 2022-12-12 DIAGNOSIS — I10 ESSENTIAL HYPERTENSION, BENIGN: ICD-10-CM

## 2022-12-12 DIAGNOSIS — I10 ESSENTIAL HYPERTENSION, BENIGN: Primary | ICD-10-CM

## 2022-12-13 LAB
A/G RATIO: 1.9 (ref 1.1–2.2)
ALBUMIN SERPL-MCNC: 4.7 G/DL (ref 3.4–5)
ALP BLD-CCNC: 92 U/L (ref 40–129)
ALT SERPL-CCNC: 35 U/L (ref 10–40)
ANION GAP SERPL CALCULATED.3IONS-SCNC: 14 MMOL/L (ref 3–16)
AST SERPL-CCNC: 27 U/L (ref 15–37)
BILIRUB SERPL-MCNC: 0.4 MG/DL (ref 0–1)
BUN BLDV-MCNC: 10 MG/DL (ref 7–20)
CALCIUM SERPL-MCNC: 9.7 MG/DL (ref 8.3–10.6)
CHLORIDE BLD-SCNC: 102 MMOL/L (ref 99–110)
CHOLESTEROL, TOTAL: 227 MG/DL (ref 0–199)
CO2: 24 MMOL/L (ref 21–32)
CREAT SERPL-MCNC: 0.9 MG/DL (ref 0.9–1.3)
GFR SERPL CREATININE-BSD FRML MDRD: >60 ML/MIN/{1.73_M2}
GLUCOSE BLD-MCNC: 86 MG/DL (ref 70–99)
HDLC SERPL-MCNC: 47 MG/DL (ref 40–60)
LDL CHOLESTEROL CALCULATED: 154 MG/DL
POTASSIUM SERPL-SCNC: 4.6 MMOL/L (ref 3.5–5.1)
SODIUM BLD-SCNC: 140 MMOL/L (ref 136–145)
TOTAL PROTEIN: 7.2 G/DL (ref 6.4–8.2)
TRIGL SERPL-MCNC: 132 MG/DL (ref 0–150)
VLDLC SERPL CALC-MCNC: 26 MG/DL

## 2022-12-13 NOTE — RESULT ENCOUNTER NOTE
Good Morning Kiara ,    Thank you for getting your blood work done. Your lab results are back and your cholesterol is a little bit high. .  I will include some tips on what you can do to work on it. Keep up the good work!     Have a great week,    Dr. Hortencia Rollins      For your convenience I have listed your future appointment(s) below:  Future Appointments  5/1/2023   4:15 PM    MD STEVE Chung - [facility]      The 10-year ASCVD risk score (Milagros CERON, et al., 2019) is: 2.5%    Values used to calculate the score:      Age: 43 years      Sex: Male      Is Non- : No      Diabetic: No      Tobacco smoker: No      Systolic Blood Pressure: 478 mmHg      Is BP treated: Yes      HDL Cholesterol: 47 mg/dL      Total Cholesterol: 227 mg/dL

## 2023-01-20 DIAGNOSIS — I10 ESSENTIAL HYPERTENSION, BENIGN: ICD-10-CM

## 2023-01-20 RX ORDER — AMLODIPINE BESYLATE 5 MG/1
TABLET ORAL
Qty: 90 TABLET | Refills: 1 | Status: SHIPPED | OUTPATIENT
Start: 2023-01-20

## 2023-01-20 NOTE — PROGRESS NOTES
Medication:   Requested Prescriptions     Pending Prescriptions Disp Refills    amLODIPine (NORVASC) 5 MG tablet 90 tablet 1        Last Filled:  07/11/2022    Patient Phone Number: [phone] (home) [phone] (work)    Last appt: 11/1/2022   Next appt: 5/1/2023    Last OARRS: No flowsheet data found.

## 2023-05-01 ENCOUNTER — OFFICE VISIT (OUTPATIENT)
Dept: PRIMARY CARE CLINIC | Age: 43
End: 2023-05-01
Payer: COMMERCIAL

## 2023-05-01 VITALS
OXYGEN SATURATION: 98 % | TEMPERATURE: 97.6 F | SYSTOLIC BLOOD PRESSURE: 136 MMHG | HEART RATE: 89 BPM | BODY MASS INDEX: 36.57 KG/M2 | DIASTOLIC BLOOD PRESSURE: 87 MMHG | WEIGHT: 270 LBS | HEIGHT: 72 IN

## 2023-05-01 DIAGNOSIS — I10 ESSENTIAL HYPERTENSION, BENIGN: Primary | ICD-10-CM

## 2023-05-01 DIAGNOSIS — K21.9 GASTROESOPHAGEAL REFLUX DISEASE WITHOUT ESOPHAGITIS: ICD-10-CM

## 2023-05-01 DIAGNOSIS — E66.01 MORBID OBESITY DUE TO EXCESS CALORIES (HCC): ICD-10-CM

## 2023-05-01 PROCEDURE — 99214 OFFICE O/P EST MOD 30 MIN: CPT | Performed by: FAMILY MEDICINE

## 2023-05-01 PROCEDURE — 3075F SYST BP GE 130 - 139MM HG: CPT | Performed by: FAMILY MEDICINE

## 2023-05-01 PROCEDURE — 3079F DIAST BP 80-89 MM HG: CPT | Performed by: FAMILY MEDICINE

## 2023-05-01 RX ORDER — PANTOPRAZOLE SODIUM 40 MG/1
40 TABLET, DELAYED RELEASE ORAL DAILY
Qty: 90 TABLET | Refills: 1 | Status: SHIPPED | OUTPATIENT
Start: 2023-05-01 | End: 2024-04-30

## 2023-05-01 RX ORDER — AMLODIPINE BESYLATE 5 MG/1
TABLET ORAL
Qty: 90 TABLET | Refills: 1 | Status: SHIPPED | OUTPATIENT
Start: 2023-05-01

## 2023-05-01 RX ORDER — LISINOPRIL 10 MG/1
10 TABLET ORAL DAILY
Qty: 90 TABLET | Refills: 3 | Status: SHIPPED | OUTPATIENT
Start: 2023-05-01

## 2023-05-01 ASSESSMENT — PATIENT HEALTH QUESTIONNAIRE - PHQ9
1. LITTLE INTEREST OR PLEASURE IN DOING THINGS: 0
SUM OF ALL RESPONSES TO PHQ QUESTIONS 1-9: 0
SUM OF ALL RESPONSES TO PHQ QUESTIONS 1-9: 0
2. FEELING DOWN, DEPRESSED OR HOPELESS: 0
SUM OF ALL RESPONSES TO PHQ QUESTIONS 1-9: 0
SUM OF ALL RESPONSES TO PHQ9 QUESTIONS 1 & 2: 0
SUM OF ALL RESPONSES TO PHQ QUESTIONS 1-9: 0

## 2023-05-01 NOTE — PROGRESS NOTES
Chief Complaint   Patient presents with    Hypertension     Refill for meds for hypertension. HPI: Kiara  presents for evaluation and management of hypertension reflux and obesity. Kiara notes he is really struggling with diet. His children are young and he is running them around quite a bit and has to frequently get food on the run. He has not made much progress with his weight loss. He notes his reflux frequently exacerbates if he eats late. He states the Protonix helps but he does not quite need it every day. Lifestyle measures to help more. He notes he is using his blood pressure medicine and denies lightheadedness or dizziness. Today's PHQ:    PHQ Scores 5/1/2023 11/1/2022 7/20/2021 3/20/2020 2/22/2019 12/3/2018   PHQ2 Score 0 0 0 0 0 0   PHQ9 Score 0 0 0 0 0 0     Interpretation of Total Score Depression Severity: 1-4 = Minimal depression, 5-9 = Mild depression, 10-14 = Moderate depression, 15-19 = Moderately severe depression, 20-27 = Severe depression        Review of Systems    Allergies   Allergen Reactions    Aleve [Naproxen Sodium]      Hives    Hctz      Hives      Codeine     Ibuprofen      angioedema    Peanuts [Peanut Oil]      Rash       New Prescriptions    No medications on file     Current Outpatient Medications   Medication Sig Dispense Refill    amLODIPine (NORVASC) 5 MG tablet TAKE ONE TABLET BY MOUTH DAILY 90 tablet 1    pantoprazole (PROTONIX) 40 MG tablet Take 1 tablet by mouth daily 90 tablet 1    lisinopril (PRINIVIL;ZESTRIL) 10 MG tablet Take 1 tablet by mouth daily 90 tablet 3    budesonide (RHINOCORT ALLERGY) 32 MCG/ACT nasal spray 1 spray by Each Nostril route daily 3 each 3     No current facility-administered medications for this visit.        Past Medical History:   Diagnosis Date    COVID-19 01/02/2022    GERD (gastroesophageal reflux disease) 05/12/2014    Herniated lumbar disc without myelopathy     Dr. Мария Briones    HTN (hypertension) 03/20/2012    Irritable

## 2023-05-01 NOTE — PATIENT INSTRUCTIONS
[facility] [address] [facility] Internal Medicine  [address], [address] [facility]  :  Lovenia Barbara Dr. Sydelle Flattery Dr. Christain Lennox Dr. Dorien Speaker Internal Medicine  [facility] [address], [address] [facility]  [phone]  OfficeLead:[facility]  [address] [place] [address]  [phone]  OfficeLead:Steve Sales    [address]  [phone]  :  Emiliano Dickinson

## 2023-12-20 DIAGNOSIS — I10 ESSENTIAL HYPERTENSION, BENIGN: ICD-10-CM

## 2023-12-21 LAB
ALBUMIN SERPL-MCNC: 4.9 G/DL (ref 3.4–5)
ALBUMIN/GLOB SERPL: 1.6 {RATIO} (ref 1.1–2.2)
ALP SERPL-CCNC: 94 U/L (ref 40–129)
ALT SERPL-CCNC: 37 U/L (ref 10–40)
ANION GAP SERPL CALCULATED.3IONS-SCNC: 9 MMOL/L (ref 3–16)
AST SERPL-CCNC: 29 U/L (ref 15–37)
BILIRUB SERPL-MCNC: 0.3 MG/DL (ref 0–1)
BUN SERPL-MCNC: 15 MG/DL (ref 7–20)
CALCIUM SERPL-MCNC: 9.4 MG/DL (ref 8.3–10.6)
CHLORIDE SERPL-SCNC: 100 MMOL/L (ref 99–110)
CHOLEST SERPL-MCNC: 210 MG/DL (ref 0–199)
CO2 SERPL-SCNC: 27 MMOL/L (ref 21–32)
CREAT SERPL-MCNC: 1 MG/DL (ref 0.9–1.3)
EST. AVERAGE GLUCOSE BLD GHB EST-MCNC: 116.9 MG/DL
GFR SERPLBLD CREATININE-BSD FMLA CKD-EPI: >60 ML/MIN/{1.73_M2}
GLUCOSE SERPL-MCNC: 90 MG/DL (ref 70–99)
HBA1C MFR BLD: 5.7 %
HDLC SERPL-MCNC: 38 MG/DL (ref 40–60)
LDLC SERPL CALC-MCNC: 140 MG/DL
POTASSIUM SERPL-SCNC: 5 MMOL/L (ref 3.5–5.1)
PROT SERPL-MCNC: 7.9 G/DL (ref 6.4–8.2)
SODIUM SERPL-SCNC: 136 MMOL/L (ref 136–145)
TRIGL SERPL-MCNC: 161 MG/DL (ref 0–150)
VLDLC SERPL CALC-MCNC: 32 MG/DL

## 2024-01-22 DIAGNOSIS — I10 HTN (HYPERTENSION): ICD-10-CM

## 2024-01-22 DIAGNOSIS — I10 ESSENTIAL HYPERTENSION, BENIGN: ICD-10-CM

## 2024-01-22 RX ORDER — AMLODIPINE BESYLATE 5 MG/1
5 TABLET ORAL DAILY
Qty: 30 TABLET | Refills: 11 | OUTPATIENT
Start: 2024-01-22 | End: 2025-01-21

## 2024-01-22 NOTE — TELEPHONE ENCOUNTER
Medication:   Requested Prescriptions     Pending Prescriptions Disp Refills    amLODIPine (NORVASC) 5 MG tablet 30 tablet 11     Sig: Take 1 tablet by mouth daily        Last Filled:      Patient Phone Number: [phone] (home) [phone] (work)    Last appt: 12/20/2023   Next appt: Visit date not found    Last OARRS:        No data to display

## 2024-03-19 DIAGNOSIS — I10 ESSENTIAL HYPERTENSION, BENIGN: ICD-10-CM

## 2024-03-19 DIAGNOSIS — K21.9 GASTROESOPHAGEAL REFLUX DISEASE WITHOUT ESOPHAGITIS: ICD-10-CM

## 2024-03-19 RX ORDER — LISINOPRIL 20 MG/1
20 TABLET ORAL DAILY
Qty: 90 TABLET | Refills: 3 | Status: SHIPPED | OUTPATIENT
Start: 2024-03-19

## 2024-03-19 RX ORDER — PANTOPRAZOLE SODIUM 40 MG/1
40 TABLET, DELAYED RELEASE ORAL DAILY
Qty: 90 TABLET | Refills: 3 | Status: SHIPPED | OUTPATIENT
Start: 2024-03-19 | End: 2025-03-19

## 2024-03-19 NOTE — TELEPHONE ENCOUNTER
[id number] [place], [place] - [facility] [address] - P [phone] - F [phone] [[id number]]       lisinopril (PRINIVIL;ZESTRIL) 20 MG tablet       pantoprazole (PROTONIX) 40 MG tablet     Pt called in requesting a refill. [facility] is no longer filling [facility] medication. Can we send them to the pharmacy ABOVE.

## 2024-03-19 NOTE — TELEPHONE ENCOUNTER
Medication:   Requested Prescriptions     Pending Prescriptions Disp Refills    pantoprazole (PROTONIX) 40 MG tablet 90 tablet 1     Sig: Take 1 tablet by mouth daily    lisinopril (PRINIVIL;ZESTRIL) 20 MG tablet 90 tablet 1     Sig: Take 1 tablet by mouth daily        Last Filled:  12/20/23    Patient Phone Number: [phone] (home) [phone] (work)    Last appt: 12/20/2023   Next appt: 8/19/2024    Last OARRS:        No data to display

## 2024-08-19 ENCOUNTER — OFFICE VISIT (OUTPATIENT)
Dept: PRIMARY CARE CLINIC | Age: 44
End: 2024-08-19

## 2024-08-19 VITALS
DIASTOLIC BLOOD PRESSURE: 78 MMHG | HEART RATE: 89 BPM | BODY MASS INDEX: 35.62 KG/M2 | HEIGHT: 72 IN | TEMPERATURE: 97 F | WEIGHT: 263 LBS | SYSTOLIC BLOOD PRESSURE: 121 MMHG

## 2024-08-19 DIAGNOSIS — R29.818 SUSPECTED SLEEP APNEA: ICD-10-CM

## 2024-08-19 DIAGNOSIS — I10 ESSENTIAL HYPERTENSION, BENIGN: ICD-10-CM

## 2024-08-19 DIAGNOSIS — R73.03 PREDIABETES: Primary | ICD-10-CM

## 2024-08-19 PROCEDURE — 3078F DIAST BP <80 MM HG: CPT | Performed by: STUDENT IN AN ORGANIZED HEALTH CARE EDUCATION/TRAINING PROGRAM

## 2024-08-19 PROCEDURE — 99214 OFFICE O/P EST MOD 30 MIN: CPT | Performed by: STUDENT IN AN ORGANIZED HEALTH CARE EDUCATION/TRAINING PROGRAM

## 2024-08-19 PROCEDURE — G2211 COMPLEX E/M VISIT ADD ON: HCPCS | Performed by: STUDENT IN AN ORGANIZED HEALTH CARE EDUCATION/TRAINING PROGRAM

## 2024-08-19 PROCEDURE — 3074F SYST BP LT 130 MM HG: CPT | Performed by: STUDENT IN AN ORGANIZED HEALTH CARE EDUCATION/TRAINING PROGRAM

## 2024-08-19 ASSESSMENT — PATIENT HEALTH QUESTIONNAIRE - PHQ9
2. FEELING DOWN, DEPRESSED OR HOPELESS: NOT AT ALL
SUM OF ALL RESPONSES TO PHQ QUESTIONS 1-9: 0
SUM OF ALL RESPONSES TO PHQ9 QUESTIONS 1 & 2: 0
1. LITTLE INTEREST OR PLEASURE IN DOING THINGS: NOT AT ALL

## 2024-08-19 ASSESSMENT — ENCOUNTER SYMPTOMS
SHORTNESS OF BREATH: 0
WHEEZING: 0
NAUSEA: 0

## 2024-08-19 NOTE — PROGRESS NOTES
Dispense Refill    pantoprazole (PROTONIX) 40 MG tablet Take 1 tablet by mouth daily 90 tablet 3    lisinopril (PRINIVIL;ZESTRIL) 20 MG tablet Take 1 tablet by mouth daily 90 tablet 3     No current facility-administered medications on file prior to visit.      Past Medical History:   Diagnosis Date    COVID-19 01/02/2022    GERD (gastroesophageal reflux disease) 05/12/2014    Herniated lumbar disc without myelopathy     Dr. Smith    HTN (hypertension) 03/20/2012    Irritable bowel syndrome     Morbid obesity due to excess calories (HCC) 09/29/2016    Urticaria 02/14/2012    Eggs, milk, peanut, wheat Dr. Silverio     Patient Active Problem List   Diagnosis    Irritable bowel syndrome    History of potentially hazardous body fluid exposure    Essential hypertension, benign    GERD (gastroesophageal reflux disease)    Morbid obesity due to excess calories (HCC)    Hypercholesterolemia       PE  Vitals:    08/19/24 1401   BP: 121/78   Pulse: 89   Temp: 97 °F (36.1 °C)   TempSrc: Temporal   Weight: 119.3 kg (263 lb)   Height: 1.829 m (6')     Estimated body mass index is 35.67 kg/m² as calculated from the following:    Height as of this encounter: 1.829 m (6').    Weight as of this encounter: 119.3 kg (263 lb).    Physical Exam  Vitals reviewed.   Constitutional:       Appearance: Normal appearance. He is obese.   HENT:      Head: Normocephalic and atraumatic.      Right Ear: Tympanic membrane normal.      Left Ear: Tympanic membrane normal.      Nose: Nose normal.      Mouth/Throat:      Mouth: Mucous membranes are moist.      Pharynx: Oropharynx is clear.   Eyes:      Conjunctiva/sclera: Conjunctivae normal.      Pupils: Pupils are equal, round, and reactive to light.   Cardiovascular:      Rate and Rhythm: Normal rate and regular rhythm.      Pulses: Normal pulses.      Heart sounds: Normal heart sounds.   Pulmonary:      Effort: Pulmonary effort is normal.      Breath sounds: Normal breath sounds.   Abdominal:

## 2024-09-09 ENCOUNTER — OFFICE VISIT (OUTPATIENT)
Dept: ENT CLINIC | Age: 44
End: 2024-09-09
Payer: COMMERCIAL

## 2024-09-09 ENCOUNTER — OFFICE VISIT (OUTPATIENT)
Dept: SLEEP MEDICINE | Age: 44
End: 2024-09-09
Payer: COMMERCIAL

## 2024-09-09 VITALS
RESPIRATION RATE: 18 BRPM | HEART RATE: 97 BPM | WEIGHT: 264.4 LBS | OXYGEN SATURATION: 97 % | BODY MASS INDEX: 35.81 KG/M2 | HEIGHT: 72 IN | TEMPERATURE: 97.9 F | SYSTOLIC BLOOD PRESSURE: 127 MMHG | DIASTOLIC BLOOD PRESSURE: 84 MMHG

## 2024-09-09 VITALS
OXYGEN SATURATION: 100 % | SYSTOLIC BLOOD PRESSURE: 127 MMHG | HEIGHT: 72 IN | WEIGHT: 265 LBS | HEART RATE: 97 BPM | DIASTOLIC BLOOD PRESSURE: 84 MMHG | BODY MASS INDEX: 35.89 KG/M2

## 2024-09-09 DIAGNOSIS — R06.83 SNORING: ICD-10-CM

## 2024-09-09 DIAGNOSIS — I10 ESSENTIAL HYPERTENSION, BENIGN: ICD-10-CM

## 2024-09-09 DIAGNOSIS — R06.89 GASPING FOR BREATH: ICD-10-CM

## 2024-09-09 DIAGNOSIS — R09.81 NASAL CONGESTION: Primary | ICD-10-CM

## 2024-09-09 DIAGNOSIS — R73.03 PREDIABETES: ICD-10-CM

## 2024-09-09 DIAGNOSIS — G47.33 OSA (OBSTRUCTIVE SLEEP APNEA): Primary | ICD-10-CM

## 2024-09-09 DIAGNOSIS — G47.19 EXCESSIVE DAYTIME SLEEPINESS: ICD-10-CM

## 2024-09-09 DIAGNOSIS — J34.3 HYPERTROPHY OF INFERIOR NASAL TURBINATE: ICD-10-CM

## 2024-09-09 DIAGNOSIS — G47.30 SLEEP DISORDER BREATHING: ICD-10-CM

## 2024-09-09 DIAGNOSIS — E66.01 CLASS 2 SEVERE OBESITY DUE TO EXCESS CALORIES WITH SERIOUS COMORBIDITY AND BODY MASS INDEX (BMI) OF 35.0 TO 35.9 IN ADULT (HCC): ICD-10-CM

## 2024-09-09 LAB
ALBUMIN SERPL-MCNC: 4.6 G/DL (ref 3.4–5)
ALBUMIN/GLOB SERPL: 1.9 {RATIO} (ref 1.1–2.2)
ALP SERPL-CCNC: 84 U/L (ref 40–129)
ALT SERPL-CCNC: 45 U/L (ref 10–40)
ANION GAP SERPL CALCULATED.3IONS-SCNC: 15 MMOL/L (ref 3–16)
AST SERPL-CCNC: 32 U/L (ref 15–37)
BILIRUB SERPL-MCNC: 0.4 MG/DL (ref 0–1)
BUN SERPL-MCNC: 10 MG/DL (ref 7–20)
CALCIUM SERPL-MCNC: 9 MG/DL (ref 8.3–10.6)
CHLORIDE SERPL-SCNC: 103 MMOL/L (ref 99–110)
CHOLEST SERPL-MCNC: 228 MG/DL (ref 0–199)
CO2 SERPL-SCNC: 21 MMOL/L (ref 21–32)
CREAT SERPL-MCNC: 1 MG/DL (ref 0.9–1.3)
GFR SERPLBLD CREATININE-BSD FMLA CKD-EPI: >90 ML/MIN/{1.73_M2}
GLUCOSE SERPL-MCNC: 86 MG/DL (ref 70–99)
HDLC SERPL-MCNC: 45 MG/DL (ref 40–60)
LDLC SERPL CALC-MCNC: 143 MG/DL
POTASSIUM SERPL-SCNC: 4.5 MMOL/L (ref 3.5–5.1)
PROT SERPL-MCNC: 7 G/DL (ref 6.4–8.2)
SODIUM SERPL-SCNC: 139 MMOL/L (ref 136–145)
TRIGL SERPL-MCNC: 198 MG/DL (ref 0–150)
VLDLC SERPL CALC-MCNC: 40 MG/DL

## 2024-09-09 PROCEDURE — 99203 OFFICE O/P NEW LOW 30 MIN: CPT | Performed by: OTOLARYNGOLOGY

## 2024-09-09 PROCEDURE — 3074F SYST BP LT 130 MM HG: CPT | Performed by: PSYCHIATRY & NEUROLOGY

## 2024-09-09 PROCEDURE — 3079F DIAST BP 80-89 MM HG: CPT | Performed by: OTOLARYNGOLOGY

## 2024-09-09 PROCEDURE — 3079F DIAST BP 80-89 MM HG: CPT | Performed by: PSYCHIATRY & NEUROLOGY

## 2024-09-09 PROCEDURE — 99204 OFFICE O/P NEW MOD 45 MIN: CPT | Performed by: PSYCHIATRY & NEUROLOGY

## 2024-09-09 PROCEDURE — 3074F SYST BP LT 130 MM HG: CPT | Performed by: OTOLARYNGOLOGY

## 2024-09-09 RX ORDER — FLUTICASONE PROPIONATE 50 MCG
1 SPRAY, SUSPENSION (ML) NASAL DAILY
Qty: 32 G | Refills: 1 | Status: SHIPPED | OUTPATIENT
Start: 2024-09-09

## 2024-09-09 ASSESSMENT — SLEEP AND FATIGUE QUESTIONNAIRES
HOW LIKELY ARE YOU TO NOD OFF OR FALL ASLEEP WHILE SITTING AND READING: MODERATE CHANCE OF DOZING
HOW LIKELY ARE YOU TO NOD OFF OR FALL ASLEEP IN A CAR, WHILE STOPPED FOR A FEW MINUTES IN TRAFFIC: MODERATE CHANCE OF DOZING
HOW LIKELY ARE YOU TO NOD OFF OR FALL ASLEEP WHILE SITTING QUIETLY AFTER LUNCH WITHOUT ALCOHOL: SLIGHT CHANCE OF DOZING
HOW LIKELY ARE YOU TO NOD OFF OR FALL ASLEEP WHILE WATCHING TV: MODERATE CHANCE OF DOZING
ESS TOTAL SCORE: 13
HOW LIKELY ARE YOU TO NOD OFF OR FALL ASLEEP WHILE SITTING INACTIVE IN A PUBLIC PLACE: MODERATE CHANCE OF DOZING
HOW LIKELY ARE YOU TO NOD OFF OR FALL ASLEEP WHILE LYING DOWN TO REST IN THE AFTERNOON WHEN CIRCUMSTANCES PERMIT: MODERATE CHANCE OF DOZING
HOW LIKELY ARE YOU TO NOD OFF OR FALL ASLEEP WHILE SITTING AND TALKING TO SOMEONE: SLIGHT CHANCE OF DOZING
HOW LIKELY ARE YOU TO NOD OFF OR FALL ASLEEP WHEN YOU ARE A PASSENGER IN A CAR FOR AN HOUR WITHOUT A BREAK: SLIGHT CHANCE OF DOZING

## 2024-09-09 ASSESSMENT — ENCOUNTER SYMPTOMS
CHOKING: 1
ALLERGIC/IMMUNOLOGIC NEGATIVE: 1
EYES NEGATIVE: 1
SORE THROAT: 1
GASTROINTESTINAL NEGATIVE: 1

## 2024-09-10 LAB
EST. AVERAGE GLUCOSE BLD GHB EST-MCNC: 114 MG/DL
HBA1C MFR BLD: 5.6 %

## 2024-09-17 ENCOUNTER — HOSPITAL ENCOUNTER (OUTPATIENT)
Dept: SLEEP CENTER | Age: 44
Discharge: HOME | End: 2024-09-17
Attending: PSYCHIATRY & NEUROLOGY
Payer: COMMERCIAL

## 2024-09-17 DIAGNOSIS — G47.33 OSA (OBSTRUCTIVE SLEEP APNEA): ICD-10-CM

## 2024-09-17 PROCEDURE — 95806 SLEEP STUDY UNATT&RESP EFFT: CPT

## 2024-09-23 PROBLEM — G47.33 OSA (OBSTRUCTIVE SLEEP APNEA): Status: ACTIVE | Noted: 2024-09-23

## 2024-09-24 ENCOUNTER — TELEPHONE (OUTPATIENT)
Dept: PULMONOLOGY | Age: 44
End: 2024-09-24

## 2024-10-16 ENCOUNTER — HOSPITAL ENCOUNTER (OUTPATIENT)
Dept: SLEEP CENTER | Age: 44
Discharge: HOME | End: 2024-10-16
Payer: COMMERCIAL

## 2024-10-16 DIAGNOSIS — G47.33 OSA (OBSTRUCTIVE SLEEP APNEA): ICD-10-CM

## 2024-10-16 PROCEDURE — 95811 POLYSOM 6/>YRS CPAP 4/> PARM: CPT

## 2024-10-17 NOTE — PROGRESS NOTES
Shar Garcessarash         : 1980  [] [facility]     [] [facility]      [] [facility]     [facility]    [] [facility]  [] [facility]  [] [facility]   [] [facility] [] Other:  Diagnosis: [x] JEANINE (G47.33) [] CSA (G47.31) [] Apnea (G47.30)   Length of Need: [] 12 Months [x] 99 Months [] Other:    Machine (HIRAM!):  [x] ResMed AirSense     Auto [] Other:     [x]  CPAP () [] Bilevel ()   Mode: [x] Auto [] Spontaneous    Mode: [] Auto [] Spontaneous                        8 cm                 Comfort Settings:     If the order for CPAP  - Ramp Pressure: 5 cmH2O                                        - Ramp time: 15 min                                     -  Flex/EPR - 3 full time       Humidifier: [x] Heated ()        [x] Water chamber replacement ()/ 1 per 6 months        Mask:  Please always start with the mask the patient used during the titraion    [x] Full Face () /1 per 3 months    [x] Patient Choice - Size and fit mask    [x] Dispense: Airfit P10 Medium     [x] Headgear () / 1 per 3 months    [x] Interface Replacement ()/1 per month            Tubing: [x] Heated ()/1 per 3 months    [] Standard ()/1 per 3 months [] Other:           Filters: [x] Non-disposable ()/1 per 6 months     [x] Ultra-Fine, Disposable ()/2 per month        Miscellaneous: [x] Chin Strap ()/ 1 per 6 months [] O2 bleed-in:       LPM   [] Oximetry on CPAP/Bilevel []  Other:          Start Order Date: 10/17/24    MEDICAL JUSTIFICATION:  I, the undersigned, certify that the above prescribed supplies are medically necessary for this patient’s wellbeing.  In my opinion, the supplies are both reasonable and necessary in reference to accepted standards of medicalpractice in treatment of this patient’s condition.    Vick Arreguin MD      NPI: [id number]       Order Signed Date: 10/17/24    Electronically signed by Vick Arreguin MD on 10/17/2024 at 11:57 AM

## 2024-10-22 ENCOUNTER — TELEPHONE (OUTPATIENT)
Dept: PULMONOLOGY | Age: 44
End: 2024-10-22

## 2024-10-22 NOTE — TELEPHONE ENCOUNTER
Titration study shows adequate control of the events at a CPAP pressure of 8 cm.      NEED DME:     Patient will need appointment 31-90 days after starting new machine

## 2024-10-28 ENCOUNTER — TELEPHONE (OUTPATIENT)
Dept: PULMONOLOGY | Age: 44
End: 2024-10-28

## 2025-01-14 ENCOUNTER — OFFICE VISIT (OUTPATIENT)
Dept: SLEEP MEDICINE | Age: 45
End: 2025-01-14
Payer: COMMERCIAL

## 2025-01-14 VITALS
WEIGHT: 268.4 LBS | SYSTOLIC BLOOD PRESSURE: 115 MMHG | OXYGEN SATURATION: 99 % | RESPIRATION RATE: 18 BRPM | DIASTOLIC BLOOD PRESSURE: 70 MMHG | HEART RATE: 95 BPM | BODY MASS INDEX: 36.35 KG/M2 | TEMPERATURE: 97.7 F | HEIGHT: 72 IN

## 2025-01-14 DIAGNOSIS — E66.09 CLASS 2 OBESITY DUE TO EXCESS CALORIES WITHOUT SERIOUS COMORBIDITY WITH BODY MASS INDEX (BMI) OF 35.0 TO 35.9 IN ADULT: ICD-10-CM

## 2025-01-14 DIAGNOSIS — Z99.89 DEPENDENCE ON OTHER ENABLING MACHINES AND DEVICES: ICD-10-CM

## 2025-01-14 DIAGNOSIS — E66.812 CLASS 2 OBESITY DUE TO EXCESS CALORIES WITHOUT SERIOUS COMORBIDITY WITH BODY MASS INDEX (BMI) OF 35.0 TO 35.9 IN ADULT: ICD-10-CM

## 2025-01-14 DIAGNOSIS — I10 ESSENTIAL HYPERTENSION, BENIGN: ICD-10-CM

## 2025-01-14 DIAGNOSIS — G47.33 OSA ON CPAP: Primary | ICD-10-CM

## 2025-01-14 PROCEDURE — 3078F DIAST BP <80 MM HG: CPT | Performed by: PSYCHIATRY & NEUROLOGY

## 2025-01-14 PROCEDURE — 99214 OFFICE O/P EST MOD 30 MIN: CPT | Performed by: PSYCHIATRY & NEUROLOGY

## 2025-01-14 PROCEDURE — G2211 COMPLEX E/M VISIT ADD ON: HCPCS | Performed by: PSYCHIATRY & NEUROLOGY

## 2025-01-14 PROCEDURE — 3074F SYST BP LT 130 MM HG: CPT | Performed by: PSYCHIATRY & NEUROLOGY

## 2025-01-14 ASSESSMENT — SLEEP AND FATIGUE QUESTIONNAIRES
HOW LIKELY ARE YOU TO NOD OFF OR FALL ASLEEP WHILE SITTING INACTIVE IN A PUBLIC PLACE: WOULD NEVER DOZE
HOW LIKELY ARE YOU TO NOD OFF OR FALL ASLEEP WHEN YOU ARE A PASSENGER IN A CAR FOR AN HOUR WITHOUT A BREAK: WOULD NEVER DOZE
HOW LIKELY ARE YOU TO NOD OFF OR FALL ASLEEP WHILE SITTING AND READING: SLIGHT CHANCE OF DOZING
HOW LIKELY ARE YOU TO NOD OFF OR FALL ASLEEP IN A CAR, WHILE STOPPED FOR A FEW MINUTES IN TRAFFIC: WOULD NEVER DOZE
HOW LIKELY ARE YOU TO NOD OFF OR FALL ASLEEP WHILE LYING DOWN TO REST IN THE AFTERNOON WHEN CIRCUMSTANCES PERMIT: SLIGHT CHANCE OF DOZING
HOW LIKELY ARE YOU TO NOD OFF OR FALL ASLEEP WHILE SITTING QUIETLY AFTER LUNCH WITHOUT ALCOHOL: WOULD NEVER DOZE
HOW LIKELY ARE YOU TO NOD OFF OR FALL ASLEEP WHILE SITTING AND TALKING TO SOMEONE: WOULD NEVER DOZE
HOW LIKELY ARE YOU TO NOD OFF OR FALL ASLEEP WHILE WATCHING TV: WOULD NEVER DOZE
ESS TOTAL SCORE: 2

## 2025-01-14 NOTE — PROGRESS NOTES
fluticasone (FLONASE) 50 MCG/ACT nasal spray 1 spray by Each Nostril route daily 9/9/24  Yes Cong Ayala MD   pantoprazole (PROTONIX) 40 MG tablet Take 1 tablet by mouth daily 3/19/24 3/19/25 Yes Fanta Cardona DO   lisinopril (PRINIVIL;ZESTRIL) 20 MG tablet Take 1 tablet by mouth daily 3/19/24  Yes Fanta Cardona DO       Allergies as of 01/14/2025 - Fully Reviewed 01/14/2025   Allergen Reaction Noted    Aleve [naproxen sodium]  12/05/2011    Hctz  04/03/2012    Codeine  07/20/2011    Ibuprofen  11/16/2011    Peanuts [peanut oil]  02/11/2013       Patient Active Problem List   Diagnosis    Irritable bowel syndrome    Essential hypertension, benign    GERD (gastroesophageal reflux disease)    Morbid obesity due to excess calories    Hypercholesterolemia    Prediabetes    Suspected sleep apnea    JEANINE (obstructive sleep apnea)       Past Medical History:   Diagnosis Date    COVID-19 01/02/2022    GERD (gastroesophageal reflux disease) 05/12/2014    Herniated lumbar disc without myelopathy     Dr. Smith    HTN (hypertension) 03/20/2012    Irritable bowel syndrome     Morbid obesity due to excess calories 09/29/2016    Sleep apnea     Urticaria 02/14/2012    Eggs, milk, peanut, wheat Dr. Silverio       No past surgical history on file.    No family history on file.    Review of Systems    Objective:     Vitals:  Weight BMI Neck circumference    Wt Readings from Last 3 Encounters:   01/14/25 121.7 kg (268 lb 6.4 oz)   09/09/24 119.9 kg (264 lb 6.4 oz)   09/09/24 120.2 kg (265 lb)    Body mass index is 36.4 kg/m².       BP HR SaO2   BP Readings from Last 3 Encounters:   01/14/25 115/70   09/09/24 127/84   09/09/24 127/84    Pulse Readings from Last 3 Encounters:   01/14/25 95   09/09/24 97   09/09/24 97    SpO2 Readings from Last 3 Encounters:   01/14/25 99%   09/09/24 97%   09/09/24 100%        Themandibular molar Class :   [x]1 []2 []3      Mallampati I Airway Classification:   [x]1 []2 []3

## 2025-02-27 ENCOUNTER — OFFICE VISIT (OUTPATIENT)
Dept: PRIMARY CARE CLINIC | Age: 45
End: 2025-02-27
Payer: COMMERCIAL

## 2025-02-27 VITALS
OXYGEN SATURATION: 100 % | HEIGHT: 73 IN | WEIGHT: 262 LBS | BODY MASS INDEX: 34.72 KG/M2 | SYSTOLIC BLOOD PRESSURE: 129 MMHG | DIASTOLIC BLOOD PRESSURE: 86 MMHG | TEMPERATURE: 98 F | HEART RATE: 92 BPM

## 2025-02-27 DIAGNOSIS — E66.01 MORBID OBESITY DUE TO EXCESS CALORIES: ICD-10-CM

## 2025-02-27 DIAGNOSIS — E78.00 HYPERCHOLESTEROLEMIA: ICD-10-CM

## 2025-02-27 DIAGNOSIS — I10 ESSENTIAL HYPERTENSION, BENIGN: ICD-10-CM

## 2025-02-27 DIAGNOSIS — G47.33 OSA (OBSTRUCTIVE SLEEP APNEA): Primary | ICD-10-CM

## 2025-02-27 PROBLEM — R29.818 SUSPECTED SLEEP APNEA: Status: RESOLVED | Noted: 2024-08-19 | Resolved: 2025-02-27

## 2025-02-27 PROCEDURE — 99214 OFFICE O/P EST MOD 30 MIN: CPT | Performed by: STUDENT IN AN ORGANIZED HEALTH CARE EDUCATION/TRAINING PROGRAM

## 2025-02-27 PROCEDURE — G2211 COMPLEX E/M VISIT ADD ON: HCPCS | Performed by: STUDENT IN AN ORGANIZED HEALTH CARE EDUCATION/TRAINING PROGRAM

## 2025-02-27 PROCEDURE — 3074F SYST BP LT 130 MM HG: CPT | Performed by: STUDENT IN AN ORGANIZED HEALTH CARE EDUCATION/TRAINING PROGRAM

## 2025-02-27 PROCEDURE — 3079F DIAST BP 80-89 MM HG: CPT | Performed by: STUDENT IN AN ORGANIZED HEALTH CARE EDUCATION/TRAINING PROGRAM

## 2025-02-27 SDOH — ECONOMIC STABILITY: FOOD INSECURITY: WITHIN THE PAST 12 MONTHS, YOU WORRIED THAT YOUR FOOD WOULD RUN OUT BEFORE YOU GOT MONEY TO BUY MORE.: NEVER TRUE

## 2025-02-27 SDOH — ECONOMIC STABILITY: FOOD INSECURITY: WITHIN THE PAST 12 MONTHS, THE FOOD YOU BOUGHT JUST DIDN'T LAST AND YOU DIDN'T HAVE MONEY TO GET MORE.: NEVER TRUE

## 2025-02-27 ASSESSMENT — ENCOUNTER SYMPTOMS
SHORTNESS OF BREATH: 0
WHEEZING: 0
NAUSEA: 0

## 2025-02-27 ASSESSMENT — PATIENT HEALTH QUESTIONNAIRE - PHQ9
SUM OF ALL RESPONSES TO PHQ QUESTIONS 1-9: 0
SUM OF ALL RESPONSES TO PHQ QUESTIONS 1-9: 0
2. FEELING DOWN, DEPRESSED OR HOPELESS: NOT AT ALL
SUM OF ALL RESPONSES TO PHQ QUESTIONS 1-9: 0
1. LITTLE INTEREST OR PLEASURE IN DOING THINGS: NOT AT ALL
SUM OF ALL RESPONSES TO PHQ9 QUESTIONS 1 & 2: 0
SUM OF ALL RESPONSES TO PHQ QUESTIONS 1-9: 0

## 2025-02-27 NOTE — PROGRESS NOTES
[facility]  2025    Shar Aguirre (:  1980) is a 44 y.o. male, here for evaluation of the following medical concerns:    Chief Complaint   Patient presents with    Blood Pressure Check        ASSESSMENT/ PLAN  1. JEANINE (obstructive sleep apnea)  Adherent to CPAP.  Continue follow-up with sleep medicine    2. Morbid obesity due to excess calories  Complicates all aspects of patient's care    3. Essential hypertension, benign  Controlled, continue lisinopril    4. Hypercholesterolemia  ASCVD score less than 7.5.  Continue to monitor.       Return in about 7 months (around 10/1/2025) for annual physical, blood pressure follow-up.    HPI  Patient presents today for routine follow-up.    He has been diagnosed with JEANINE and adherent to his CPAP since previous appointment.  Blood pressure remains well-controlled with lisinopril, which she tolerates without side effects.    Cholesterol has been elevated, but has not yet warranted statin therapy.    ROS  Review of Systems   Constitutional:  Negative for activity change and unexpected weight change.   HENT:  Negative for tinnitus.    Eyes:  Negative for visual disturbance.   Respiratory:  Negative for shortness of breath and wheezing.    Cardiovascular:  Negative for chest pain, palpitations and leg swelling.   Gastrointestinal:  Negative for nausea.   Genitourinary:  Negative for decreased urine volume.   Neurological:  Negative for syncope, light-headedness and headaches.       HISTORIES  Current Outpatient Medications on File Prior to Visit   Medication Sig Dispense Refill    fluticasone (FLONASE) 50 MCG/ACT nasal spray 1 spray by Each Nostril route daily (Patient not taking: Reported on 2025) 32 g 1    pantoprazole (PROTONIX) 40 MG tablet Take 1 tablet by mouth daily (Patient not taking: Reported on 2025) 90 tablet 3    lisinopril (PRINIVIL;ZESTRIL) 20 MG tablet Take 1 tablet by mouth daily (Patient not taking: Reported on 2025) 90

## 2025-03-17 DIAGNOSIS — K21.9 GASTROESOPHAGEAL REFLUX DISEASE WITHOUT ESOPHAGITIS: ICD-10-CM

## 2025-03-17 RX ORDER — PANTOPRAZOLE SODIUM 40 MG/1
40 TABLET, DELAYED RELEASE ORAL DAILY
Qty: 90 TABLET | Refills: 3 | Status: SHIPPED | OUTPATIENT
Start: 2025-03-17 | End: 2026-03-17

## 2025-03-17 NOTE — TELEPHONE ENCOUNTER
Medication:   Requested Prescriptions     Pending Prescriptions Disp Refills    pantoprazole (PROTONIX) 40 MG tablet [Pharmacy Med Name: PANTOPRAZOLE 40MG TABLETS] 90 tablet 3     Sig: TAKE 1 TABLET BY MOUTH DAILY        Last Filled:  3/19/2024     Patient Phone Number: [phone] (home) [phone] (work)    Last appt: 2/27/2025   Next appt: Visit date not found  \         Return in about 7 months (around 10/1/2025) for annual physical, blood pressure follow-up.

## 2025-03-29 DIAGNOSIS — I10 ESSENTIAL HYPERTENSION, BENIGN: ICD-10-CM

## 2025-03-31 RX ORDER — LISINOPRIL 20 MG/1
20 TABLET ORAL DAILY
Qty: 90 TABLET | Refills: 3 | Status: SHIPPED | OUTPATIENT
Start: 2025-03-31

## 2025-03-31 NOTE — TELEPHONE ENCOUNTER
Medication:   Requested Prescriptions     Pending Prescriptions Disp Refills    lisinopril (PRINIVIL;ZESTRIL) 20 MG tablet [Pharmacy Med Name: LISINOPRIL 20MG TABLETS] 90 tablet 3     Sig: TAKE 1 TABLET BY MOUTH DAILY        Last Filled:  3/19/2024    Patient Phone Number: [phone] (home) [phone] (work)    Last appt: 2/27/2025   Next appt: 9/22/2025          Return in about 7 months (around 10/1/2025) for annual physical, blood pressure follow-up.